# Patient Record
Sex: FEMALE | Race: BLACK OR AFRICAN AMERICAN | NOT HISPANIC OR LATINO | Employment: OTHER | URBAN - METROPOLITAN AREA
[De-identification: names, ages, dates, MRNs, and addresses within clinical notes are randomized per-mention and may not be internally consistent; named-entity substitution may affect disease eponyms.]

---

## 2019-01-18 ENCOUNTER — HOSPITAL ENCOUNTER (INPATIENT)
Facility: HOSPITAL | Age: 54
LOS: 6 days | Discharge: HOME | DRG: 543 | End: 2019-01-24
Attending: NEUROLOGICAL SURGERY | Admitting: NEUROLOGICAL SURGERY
Payer: OTHER GOVERNMENT

## 2019-01-18 DIAGNOSIS — I10 ESSENTIAL HYPERTENSION: ICD-10-CM

## 2019-01-18 DIAGNOSIS — E83.52 HYPERCALCEMIA: ICD-10-CM

## 2019-01-18 DIAGNOSIS — M25.551 HIP PAIN, BILATERAL: ICD-10-CM

## 2019-01-18 DIAGNOSIS — C79.51 SECONDARY MALIGNANT NEOPLASM OF BONE: ICD-10-CM

## 2019-01-18 DIAGNOSIS — C80.1 NECK PAIN DUE TO MALIGNANT NEOPLASTIC DISEASE: Primary | ICD-10-CM

## 2019-01-18 DIAGNOSIS — M54.2 NECK PAIN DUE TO MALIGNANT NEOPLASTIC DISEASE: Primary | ICD-10-CM

## 2019-01-18 DIAGNOSIS — E03.9 HYPOTHYROIDISM, UNSPECIFIED TYPE: ICD-10-CM

## 2019-01-18 DIAGNOSIS — E87.6 HYPOKALEMIA: ICD-10-CM

## 2019-01-18 DIAGNOSIS — C50.919 METASTATIC BREAST CANCER: ICD-10-CM

## 2019-01-18 DIAGNOSIS — F32.89 OTHER DEPRESSION: ICD-10-CM

## 2019-01-18 DIAGNOSIS — C41.2: ICD-10-CM

## 2019-01-18 DIAGNOSIS — G95.89 CERVICAL SPINAL MASS: ICD-10-CM

## 2019-01-18 DIAGNOSIS — M54.50 ACUTE BILATERAL LOW BACK PAIN WITHOUT SCIATICA: ICD-10-CM

## 2019-01-18 DIAGNOSIS — M25.552 HIP PAIN, BILATERAL: ICD-10-CM

## 2019-01-18 LAB
ABO + RH BLD: NORMAL
ANION GAP SERPL CALC-SCNC: 11 MMOL/L
APTT BLDCRRT: 25.5 SEC
BASOPHILS # BLD AUTO: 0.03 K/UL
BASOPHILS NFR BLD: 0.5 %
BLD GP AB SCN CELLS X3 SERPL QL: NORMAL
BUN SERPL-MCNC: 11 MG/DL
CALCIUM SERPL-MCNC: 10.6 MG/DL
CHLORIDE SERPL-SCNC: 105 MMOL/L
CO2 SERPL-SCNC: 25 MMOL/L
CREAT SERPL-MCNC: 0.8 MG/DL
DIFFERENTIAL METHOD: ABNORMAL
EOSINOPHIL # BLD AUTO: 0 K/UL
EOSINOPHIL NFR BLD: 0.3 %
ERYTHROCYTE [DISTWIDTH] IN BLOOD BY AUTOMATED COUNT: 15.7 %
EST. GFR  (AFRICAN AMERICAN): >60 ML/MIN/1.73 M^2
EST. GFR  (NON AFRICAN AMERICAN): >60 ML/MIN/1.73 M^2
GLUCOSE SERPL-MCNC: 73 MG/DL
HCT VFR BLD AUTO: 28.9 %
HGB BLD-MCNC: 9.3 G/DL
IMM GRANULOCYTES # BLD AUTO: 0.04 K/UL
IMM GRANULOCYTES NFR BLD AUTO: 0.6 %
INR PPP: 1.1
LYMPHOCYTES # BLD AUTO: 0.9 K/UL
LYMPHOCYTES NFR BLD: 13.9 %
MCH RBC QN AUTO: 27.7 PG
MCHC RBC AUTO-ENTMCNC: 32.2 G/DL
MCV RBC AUTO: 86 FL
MONOCYTES # BLD AUTO: 0.4 K/UL
MONOCYTES NFR BLD: 6.3 %
NEUTROPHILS # BLD AUTO: 5.1 K/UL
NEUTROPHILS NFR BLD: 78.4 %
NRBC BLD-RTO: 0 /100 WBC
PLATELET # BLD AUTO: 314 K/UL
PMV BLD AUTO: 11.3 FL
POTASSIUM SERPL-SCNC: 2.6 MMOL/L
PROTHROMBIN TIME: 11.3 SEC
RBC # BLD AUTO: 3.36 M/UL
SODIUM SERPL-SCNC: 141 MMOL/L
WBC # BLD AUTO: 6.54 K/UL

## 2019-01-18 PROCEDURE — 25000003 PHARM REV CODE 250: Performed by: PHYSICIAN ASSISTANT

## 2019-01-18 PROCEDURE — 80048 BASIC METABOLIC PNL TOTAL CA: CPT

## 2019-01-18 PROCEDURE — 93010 EKG 12-LEAD: ICD-10-PCS | Mod: ,,, | Performed by: INTERNAL MEDICINE

## 2019-01-18 PROCEDURE — 93005 ELECTROCARDIOGRAM TRACING: CPT

## 2019-01-18 PROCEDURE — 86850 RBC ANTIBODY SCREEN: CPT

## 2019-01-18 PROCEDURE — 85610 PROTHROMBIN TIME: CPT

## 2019-01-18 PROCEDURE — 99223 1ST HOSP IP/OBS HIGH 75: CPT | Mod: ,,, | Performed by: PHYSICIAN ASSISTANT

## 2019-01-18 PROCEDURE — 93010 ELECTROCARDIOGRAM REPORT: CPT | Mod: ,,, | Performed by: INTERNAL MEDICINE

## 2019-01-18 PROCEDURE — 85730 THROMBOPLASTIN TIME PARTIAL: CPT

## 2019-01-18 PROCEDURE — 99223 PR INITIAL HOSPITAL CARE,LEVL III: ICD-10-PCS | Mod: ,,, | Performed by: PHYSICIAN ASSISTANT

## 2019-01-18 PROCEDURE — 63600175 PHARM REV CODE 636 W HCPCS: Performed by: NEUROLOGICAL SURGERY

## 2019-01-18 PROCEDURE — 20600001 HC STEP DOWN PRIVATE ROOM

## 2019-01-18 PROCEDURE — 85025 COMPLETE CBC W/AUTO DIFF WBC: CPT

## 2019-01-18 RX ORDER — LEVOTHYROXINE SODIUM 200 UG/1
200 TABLET ORAL DAILY
COMMUNITY

## 2019-01-18 RX ORDER — POTASSIUM CHLORIDE 20 MEQ/1
20 TABLET, EXTENDED RELEASE ORAL 2 TIMES DAILY
Status: DISCONTINUED | OUTPATIENT
Start: 2019-01-18 | End: 2019-01-19

## 2019-01-18 RX ORDER — NIFEDIPINE 90 MG/1
90 TABLET, FILM COATED, EXTENDED RELEASE ORAL DAILY
COMMUNITY

## 2019-01-18 RX ORDER — POTASSIUM CHLORIDE 7.45 MG/ML
10 INJECTION INTRAVENOUS
Status: DISPENSED | OUTPATIENT
Start: 2019-01-18 | End: 2019-01-18

## 2019-01-18 RX ORDER — HEPARIN SODIUM 5000 [USP'U]/ML
5000 INJECTION, SOLUTION INTRAVENOUS; SUBCUTANEOUS EVERY 8 HOURS
Status: DISCONTINUED | OUTPATIENT
Start: 2019-01-19 | End: 2019-01-24 | Stop reason: HOSPADM

## 2019-01-18 RX ORDER — ERGOCALCIFEROL 1.25 MG/1
50000 CAPSULE ORAL
COMMUNITY
End: 2019-07-23 | Stop reason: ALTCHOICE

## 2019-01-18 RX ORDER — LEVOTHYROXINE SODIUM 50 UG/1
50 TABLET ORAL DAILY
Status: DISCONTINUED | OUTPATIENT
Start: 2019-01-18 | End: 2019-01-18

## 2019-01-18 RX ORDER — VENLAFAXINE HYDROCHLORIDE 75 MG/1
75 CAPSULE, EXTENDED RELEASE ORAL DAILY
Status: DISCONTINUED | OUTPATIENT
Start: 2019-01-18 | End: 2019-01-24 | Stop reason: HOSPADM

## 2019-01-18 RX ORDER — LEVOTHYROXINE SODIUM 125 UG/1
250 TABLET ORAL
Status: DISCONTINUED | OUTPATIENT
Start: 2019-01-19 | End: 2019-01-24 | Stop reason: HOSPADM

## 2019-01-18 RX ORDER — CHOLECALCIFEROL (VITAMIN D3) 25 MCG
2000 TABLET ORAL DAILY
Status: DISCONTINUED | OUTPATIENT
Start: 2019-01-18 | End: 2019-01-24 | Stop reason: HOSPADM

## 2019-01-18 RX ORDER — OXYCODONE AND ACETAMINOPHEN 5; 325 MG/1; MG/1
1 TABLET ORAL EVERY 4 HOURS PRN
Status: ON HOLD | COMMUNITY
End: 2019-01-24 | Stop reason: HOSPADM

## 2019-01-18 RX ORDER — VENLAFAXINE HYDROCHLORIDE 150 MG/1
75 CAPSULE, EXTENDED RELEASE ORAL DAILY
COMMUNITY

## 2019-01-18 RX ORDER — CETIRIZINE HYDROCHLORIDE 10 MG/1
10 TABLET ORAL DAILY
COMMUNITY

## 2019-01-18 RX ORDER — OXYCODONE AND ACETAMINOPHEN 5; 325 MG/1; MG/1
1 TABLET ORAL EVERY 4 HOURS PRN
Status: DISCONTINUED | OUTPATIENT
Start: 2019-01-18 | End: 2019-01-24 | Stop reason: HOSPADM

## 2019-01-18 RX ORDER — LEVOTHYROXINE SODIUM 50 UG/1
25 TABLET ORAL DAILY
COMMUNITY

## 2019-01-18 RX ORDER — DIPHENHYDRAMINE HCL 50 MG
50 CAPSULE ORAL EVERY 6 HOURS PRN
Status: DISCONTINUED | OUTPATIENT
Start: 2019-01-18 | End: 2019-01-24 | Stop reason: HOSPADM

## 2019-01-18 RX ORDER — NAPROXEN SODIUM 220 MG/1
81 TABLET, FILM COATED ORAL DAILY
COMMUNITY

## 2019-01-18 RX ORDER — SODIUM CHLORIDE 9 MG/ML
INJECTION, SOLUTION INTRAVENOUS CONTINUOUS
Status: DISCONTINUED | OUTPATIENT
Start: 2019-01-18 | End: 2019-01-18

## 2019-01-18 RX ORDER — NIFEDIPINE 30 MG/1
90 TABLET, EXTENDED RELEASE ORAL DAILY
Status: DISCONTINUED | OUTPATIENT
Start: 2019-01-18 | End: 2019-01-24 | Stop reason: HOSPADM

## 2019-01-18 RX ORDER — DOCUSATE CALCIUM 240 MG
240 CAPSULE ORAL 2 TIMES DAILY PRN
COMMUNITY

## 2019-01-18 RX ORDER — OXYCODONE HCL 10 MG/1
10 TABLET, FILM COATED, EXTENDED RELEASE ORAL EVERY 12 HOURS PRN
COMMUNITY
End: 2019-07-23

## 2019-01-18 RX ORDER — AMOXICILLIN 250 MG
1 CAPSULE ORAL 2 TIMES DAILY
Status: DISCONTINUED | OUTPATIENT
Start: 2019-01-18 | End: 2019-01-24 | Stop reason: HOSPADM

## 2019-01-18 RX ORDER — CHOLECALCIFEROL (VITAMIN D3) 25 MCG
2000 TABLET ORAL DAILY
COMMUNITY

## 2019-01-18 RX ORDER — FAMOTIDINE 20 MG/1
20 TABLET, FILM COATED ORAL 2 TIMES DAILY
Status: DISCONTINUED | OUTPATIENT
Start: 2019-01-18 | End: 2019-01-24 | Stop reason: HOSPADM

## 2019-01-18 RX ADMIN — SODIUM CHLORIDE: 9 INJECTION, SOLUTION INTRAVENOUS at 02:01

## 2019-01-18 RX ADMIN — POTASSIUM CHLORIDE 20 MEQ: 1500 TABLET, EXTENDED RELEASE ORAL at 09:01

## 2019-01-18 RX ADMIN — POTASSIUM CHLORIDE 20 MEQ: 1500 TABLET, EXTENDED RELEASE ORAL at 11:01

## 2019-01-18 RX ADMIN — SENNOSIDES AND DOCUSATE SODIUM 1 TABLET: 8.6; 5 TABLET ORAL at 09:01

## 2019-01-18 RX ADMIN — POTASSIUM CHLORIDE 10 MEQ: 7.46 INJECTION, SOLUTION INTRAVENOUS at 07:01

## 2019-01-18 RX ADMIN — OXYCODONE HYDROCHLORIDE AND ACETAMINOPHEN 1 TABLET: 5; 325 TABLET ORAL at 12:01

## 2019-01-18 RX ADMIN — POTASSIUM CHLORIDE 10 MEQ: 7.46 INJECTION, SOLUTION INTRAVENOUS at 04:01

## 2019-01-18 RX ADMIN — DIPHENHYDRAMINE HYDROCHLORIDE 50 MG: 50 CAPSULE ORAL at 09:01

## 2019-01-18 RX ADMIN — VITAMIN D, TAB 1000IU (100/BT) 2000 UNITS: 25 TAB at 12:01

## 2019-01-18 RX ADMIN — FAMOTIDINE 20 MG: 20 TABLET ORAL at 09:01

## 2019-01-18 RX ADMIN — OXYCODONE HYDROCHLORIDE AND ACETAMINOPHEN 1 TABLET: 5; 325 TABLET ORAL at 05:01

## 2019-01-18 RX ADMIN — POTASSIUM CHLORIDE 10 MEQ: 7.46 INJECTION, SOLUTION INTRAVENOUS at 05:01

## 2019-01-18 RX ADMIN — VENLAFAXINE HYDROCHLORIDE 75 MG: 75 CAPSULE, EXTENDED RELEASE ORAL at 12:01

## 2019-01-18 RX ADMIN — OXYCODONE HYDROCHLORIDE AND ACETAMINOPHEN 1 TABLET: 5; 325 TABLET ORAL at 09:01

## 2019-01-18 RX ADMIN — NIFEDIPINE 90 MG: 30 TABLET, FILM COATED, EXTENDED RELEASE ORAL at 12:01

## 2019-01-18 RX ADMIN — SODIUM CHLORIDE: 9 INJECTION, SOLUTION INTRAVENOUS at 09:01

## 2019-01-18 NOTE — H&P
[facility]  Neurosurgery  Progress Note    Subjective:     History of Present Illness: Ms. Chen Beckwith is a 53 year old female with a PMHx of recently diagnosed metastatic breast cancer in 12/2016, who [place] [facility] [place] [facility] for Neurosurgical evaluation. She originally presented to [facility] on 12/19/18 due to severe bilateral hip pain that limited her ability to walk. Imaging showed multiple mets in her hips and abdominal cavity. She presented back to [facility] on 1/15/19 to complete the scans of the brain and neck. At that time, it was discovered that her Ca levels were greatly increased so she was admitted. Once the imaging had resulted she was seen to have a clival mass and a C1 mass. She was transferred [place] [facility]. She reports constant neck, deltoid, back, hip, groin, and leg pain that worsens with movement or activity. She states that the neck pain has improved with the cervical collar. She denies any vision changes, seizure like activity, bladder/bowel incontinence, or symptoms of urinary retention. Denies any difficulty using her hands or dropping items frequently. Denies any use of anti coagulation.     Post-Op Info:  * No surgery found *           Medications:  Continuous Infusions:  Scheduled Meds:   famotidine  20 mg Oral BID    [START ON 1/19/2019] levothyroxine  250 mcg Oral Before breakfast    NIFEdipine  90 mg Oral Daily    potassium chloride  10 mEq Intravenous Q1H    potassium chloride  20 mEq Oral BID    senna-docusate 8.6-50 mg  1 tablet Oral BID    venlafaxine  75 mg Oral Daily    vitamin D  2,000 Units Oral Daily     PRN Meds:diphenhydrAMINE, oxyCODONE-acetaminophen     Review of Systems   Constitutional: no fever, chills or night sweats. No changes in weight   Eyes: no visual changes   ENT: no nasal congestion or sore throat   Respiratory: no cough or shortness of breath   Cardiovascular: no chest pain or palpitations    Gastrointestinal: no nausea or vomiting   Genitourinary: no hematuria or dysuria   Integument/Breast: no rash or pruritis   Hematologic/Lymphatic: no easy bruising or lymphadenopathy   Musculoskeletal: Positive for generalized pain throughout her entire body  Neurological: Positive for difficulty walking  Behavioral/Psych: no auditory or visual hallucinations   Endocrine: no heat or cold intolerance     Objective:     Weight: 79.9 kg (176 lb 2.4 oz)  Body mass index is 29.31 kg/m².  Vital Signs (Most Recent):  Temp: 98.5 °F (36.9 °C) (01/18/19 1230)  Pulse: 87 (01/18/19 1230)  Resp: 18 (01/18/19 1230)  BP: (!) 168/101 (01/18/19 1230)  SpO2: 96 % (01/18/19 1230) Vital Signs (24h Range):  Temp:  [97.8 °F (36.6 °C)-98.5 °F (36.9 °C)] 98.5 °F (36.9 °C)  Pulse:  [86-91] 87  Resp:  [16-20] 18  SpO2:  [95 %-97 %] 96 %  BP: (111-168)/() 168/101        Neurosurgery Physical Exam  General: well developed, well nourished, no distress.   Head: normocephalic, atraumatic  Neurologic: Alert and oriented. Thought content appropriate.  GCS: Motor: 6/Verbal: 5/Eyes: 4 GCS Total: 15  Mental Status: Awake, Alert, Oriented x 4  Language: No aphasia  Speech: No dysarthria  Cranial nerves: face symmetric, tongue midline, CN II-XII grossly intact.   Eyes: pupils equal, round, reactive to light with accomodation, EOMI.   Pulmonary: normal respirations, no signs of respiratory distress  Abdomen: soft, non-distended, not tender to palpation  Sensory: intact to light touch throughout    Motor Strength:Moves all extremities spontaneously with good tone.  No abnormal movements seen. Weakness is somewhat limited by pain.     Strength  Deltoids Triceps Biceps Wrist Extension Wrist Flexion Hand    Upper: R 4/5 4+/5 4+/5 5/5 5/5 5-/5    L 4/5 4+/5 4+/5 5/5 5/5 4+/5     Iliopsoas Quadriceps Knee  Flexion Tibialis  anterior Gastro- cnemius EHL   Lower: R 4/5 4+/5 4+/5 5/5 5/5 5/5    L 4+/5 5-/5 5-/5 5/5 5/5 5/5     Pronator Drift: no drift  noted  Finger-to-nose: Intact bilaterally  Ruvalcaba: absent  Clonus: present bilaterally  Babinski: absent  Skin: Skin is warm, dry and intact.          Significant Labs:  Recent Labs   Lab 01/18/19  0904   GLU 73      K 2.6*      CO2 25   BUN 11   CREATININE 0.8   CALCIUM 10.6*     Recent Labs   Lab 01/18/19  0904   WBC 6.54   HGB 9.3*   HCT 28.9*        Recent Labs   Lab 01/18/19  0904   INR 1.1   APTT 25.5         Significant Diagnostics:  Outside CT cervical spine and brain reviewed. Shows large lytic clival lesion and C1 lesion.     Assessment/Plan:     Cancer of spine    53 year old female with a PMHx of recently diagnosed metastatic breast cancer in 12/2016, who presents to [facility] as a transfer from [facility] with a newly diagnosed clival and cervical spine met.     -Patient neurologically stable on exam  -Will get a full metastatic workup before determining if surgical intervention is indicated  -MRI C/T/L spine, MRI pelvis, CT c/a/p ordered  -Patient reports mild itching with CT contrast. Denies SOB, throat swelling, hives, or other symptoms or anaphylaxis. Will order PRN Benadryl for CT.   -Will consult HemeOnc and RadOnc for prognosis and treatment recs once imaging completed  -Aspen collar ordered from [facility]. Collar to be worn at all times.   -Hypercalcemia: Ca 10.6. Will order an EKG and consult hospital medicine. Continue tele.   -HTN: Continue home dose of Nifedipine  -Hypothyroidism: Continue home dose of Synthroid  -Depression/Anxiety: Continue home dose of Venlafaxine  -DVT prophylaxis: KENNEY's, SCD's, and SQH  -Atelectasis prevention: IS hourly while awake  -Bowel regimen: Senna BID       Discussed with Dr. Shearer  Please call with any questions      Shweta Lea PA-C   Neurosurgery   Pager: [phone]

## 2019-01-18 NOTE — SUBJECTIVE & OBJECTIVE
Medications:  Continuous Infusions:  Scheduled Meds:   famotidine  20 mg Oral BID    [START ON 1/19/2019] levothyroxine  250 mcg Oral Before breakfast    NIFEdipine  90 mg Oral Daily    potassium chloride  10 mEq Intravenous Q1H    potassium chloride  20 mEq Oral BID    senna-docusate 8.6-50 mg  1 tablet Oral BID    venlafaxine  75 mg Oral Daily    vitamin D  2,000 Units Oral Daily     PRN Meds:diphenhydrAMINE, oxyCODONE-acetaminophen     Review of Systems   Constitutional: no fever, chills or night sweats. No changes in weight   Eyes: no visual changes   ENT: no nasal congestion or sore throat   Respiratory: no cough or shortness of breath   Cardiovascular: no chest pain or palpitations   Gastrointestinal: no nausea or vomiting   Genitourinary: no hematuria or dysuria   Integument/Breast: no rash or pruritis   Hematologic/Lymphatic: no easy bruising or lymphadenopathy   Musculoskeletal: Positive for generalized pain throughout her entire body  Neurological: Positive for difficulty walking  Behavioral/Psych: no auditory or visual hallucinations   Endocrine: no heat or cold intolerance     Objective:     Weight: 79.9 kg (176 lb 2.4 oz)  Body mass index is 29.31 kg/m².  Vital Signs (Most Recent):  Temp: 98.5 °F (36.9 °C) (01/18/19 1230)  Pulse: 87 (01/18/19 1230)  Resp: 18 (01/18/19 1230)  BP: (!) 168/101 (01/18/19 1230)  SpO2: 96 % (01/18/19 1230) Vital Signs (24h Range):  Temp:  [97.8 °F (36.6 °C)-98.5 °F (36.9 °C)] 98.5 °F (36.9 °C)  Pulse:  [86-91] 87  Resp:  [16-20] 18  SpO2:  [95 %-97 %] 96 %  BP: (111-168)/() 168/101        Neurosurgery Physical Exam  General: well developed, well nourished, no distress.   Head: normocephalic, atraumatic  Neurologic: Alert and oriented. Thought content appropriate.  GCS: Motor: 6/Verbal: 5/Eyes: 4 GCS Total: 15  Mental Status: Awake, Alert, Oriented x 4  Language: No aphasia  Speech: No dysarthria  Cranial nerves: face symmetric, tongue midline, CN II-XII  grossly intact.   Eyes: pupils equal, round, reactive to light with accomodation, EOMI.   Pulmonary: normal respirations, no signs of respiratory distress  Abdomen: soft, non-distended, not tender to palpation  Sensory: intact to light touch throughout    Motor Strength:Moves all extremities spontaneously with good tone.  No abnormal movements seen. Weakness is somewhat limited by pain.     Strength  Deltoids Triceps Biceps Wrist Extension Wrist Flexion Hand    Upper: R 4/5 4+/5 4+/5 5/5 5/5 5-/5    L 4/5 4+/5 4+/5 5/5 5/5 4+/5     Iliopsoas Quadriceps Knee  Flexion Tibialis  anterior Gastro- cnemius EHL   Lower: R 4/5 4+/5 4+/5 5/5 5/5 5/5    L 4+/5 5-/5 5-/5 5/5 5/5 5/5     Pronator Drift: no drift noted  Finger-to-nose: Intact bilaterally  Ruvalcaba: absent  Clonus: present bilaterally  Babinski: absent  Skin: Skin is warm, dry and intact.          Significant Labs:  Recent Labs   Lab 01/18/19  0904   GLU 73      K 2.6*      CO2 25   BUN 11   CREATININE 0.8   CALCIUM 10.6*     Recent Labs   Lab 01/18/19  0904   WBC 6.54   HGB 9.3*   HCT 28.9*        Recent Labs   Lab 01/18/19  0904   INR 1.1   APTT 25.5         Significant Diagnostics:  Outside CT cervical spine and brain reviewed. Shows large lytic clival lesion and C1 lesion.

## 2019-01-18 NOTE — ASSESSMENT & PLAN NOTE
53 year old female with a PMHx of recently diagnosed metastatic breast cancer in 12/2016, who presents to [facility] [place] a transfer from [facility] with a newly diagnosed clival and cervical spine met.     -Patient neurologically stable on exam  -Will get a full metastatic workup before determining if surgical intervention is indicated  -MRI C/T/L spine, MRI pelvis, CT c/a/p ordered  -Patient reports mild itching with CT contrast. Denies SOB, throat swelling, hives, or other symptoms or anaphylaxis. Will order PRN Benadryl for CT.   -Will consult HemeOnc and RadOnc for prognosis and treatment recs once imaging completed  -Aspen collar ordered from [facility]. Collar to be worn at all times.   -Hypercalcemia: Ca 10.6. Will order an EKG and consult hospital medicine. Continue tele.   -HTN: Continue home dose of Nifedipine  -Hypothyroidism: Continue home dose of Synthroid  -Depression/Anxiety: Continue home dose of Venlafaxine  -DVT prophylaxis: KENNEY's, SCD's, and SQH  -Atelectasis prevention: IS hourly while awake  -Bowel regimen: Senna BID

## 2019-01-18 NOTE — HOSPITAL COURSE
1/18-20: Admit to NSGY service for metastatic work up and surgical planning, if indicated. MRI brain/pan spine/pelvis ordered. CT chest/abd/pelvis ordered.   1/21: Hem/Onc & rad/onc consulted today, f/u recs.  Plan for likely d/c home with home oncologist f/u rafal.  1/22: Hem/Onc follow-up arranged at home, they will contact Rad/Onc in that area regarding treatment. Reports L tricep feels weaker after sleeping on it wrong during the night, otherwise exam stable. Dc tomorrow when transportation secured via ambulance back to [place]. Patient's insurance have to approve the transpo, SW will take care of this tomorrow morning. Reports compliance with c collar. Denies b/b dysfunction.   1/23: Hem/Onc & Rad/Onc follow up set up.  Spoke with Dr. Morris, patient's rad/onc.  Still awaiting transportation to be secured by insurance. No new weakness, paresthesias.  1/24: Patient wants to be transferred home by family. Requesting medication prior to transfer for 2 hour car ride home. Pain currently controlled with PO medication. No new weakness, paresthesias. Will follow-up with Dr. Shearer pending completion of radiation treatment.

## 2019-01-18 NOTE — PLAN OF CARE
CM met with patient, her niece and her sister to obtain discharge planning assessment.  Patient admitted from [facility] for cancer of spine.  Plan of care to be determined.  Planned discharge is home - Plan (A) or home with home health - Plan (B).       01/18/19 1204   Discharge Assessment   Assessment Type Discharge Planning Assessment   Confirmed/corrected address and phone number on facesheet? Yes   Assessment information obtained from? Patient   Communicated expected length of stay with patient/caregiver no   Prior to hospitilization cognitive status: Alert/Oriented   Prior to hospitalization functional status: Assistive Equipment   Current cognitive status: Alert/Oriented   Current Functional Status: Assistive Equipment   Lives With alone   Able to Return to Prior Arrangements yes   Is patient able to care for self after discharge? Yes   Who are your caregiver(s) and their phone number(s)? Eliana Hernandez niece [phone], Kev Hernandez sister [phone]   Patient's perception of discharge disposition home or selfcare   Readmission Within the Last 30 Days no previous admission in last 30 days   Patient currently being followed by outpatient case management? No   Patient currently receives any other outside agency services? No   Equipment Currently Used at Home walker, rolling;shower chair;bedside commode   Do you have any problems affording any of your prescribed medications? No   Is the patient taking medications as prescribed? yes   Does the patient have transportation home? Yes   Transportation Anticipated (patient may need an ambulance)   Does the patient receive services at the [facility]? No   Discharge Plan A Home   Discharge Plan B Home Health   DME Needed Upon Discharge  none   Patient/Family in Agreement with Plan yes

## 2019-01-18 NOTE — HPI
Ms. Chen Beckwith is a 53 year old female with a PMHx of recently diagnosed metastatic breast cancer in 12/2016, who presents to [facility] as a transfer from [facility] for Neurosurgical evaluation. She originally presented to [facility] on 12/19/18 due to severe bilateral hip pain that limited her ability to walk. Imaging showed multiple mets in her hips and abdominal cavity. She presented back to [facility] on 1/15/19 to complete the scans of the brain and neck. At that time, it was discovered that her Ca levels were greatly increased so she was admitted. Once the imaging had resulted she was seen to have a clival mass and a C1 mass. She was transferred to [facility]. She reports constant neck, deltoid, back, hip, groin, and leg pain that worsens with movement or activity. She states that the neck pain has improved with the cervical collar. She denies any vision changes, seizure like activity, bladder/bowel incontinence, or symptoms of urinary retention. Denies any difficulty using her hands or dropping items frequently. Denies any use of anti coagulation.

## 2019-01-19 PROBLEM — E87.6 HYPOKALEMIA: Status: ACTIVE | Noted: 2019-01-19

## 2019-01-19 LAB
25(OH)D3+25(OH)D2 SERPL-MCNC: 29 NG/ML
ALBUMIN SERPL BCP-MCNC: 2.7 G/DL
ALBUMIN SERPL BCP-MCNC: 3 G/DL
ALP SERPL-CCNC: 113 U/L
ALP SERPL-CCNC: 119 U/L
ALT SERPL W/O P-5'-P-CCNC: 22 U/L
ALT SERPL W/O P-5'-P-CCNC: 22 U/L
ANION GAP SERPL CALC-SCNC: 10 MMOL/L
ANION GAP SERPL CALC-SCNC: 14 MMOL/L
AST SERPL-CCNC: 65 U/L
AST SERPL-CCNC: 68 U/L
BASOPHILS # BLD AUTO: 0.02 K/UL
BASOPHILS NFR BLD: 0.3 %
BILIRUB DIRECT SERPL-MCNC: 0.1 MG/DL
BILIRUB SERPL-MCNC: 0.2 MG/DL
BILIRUB SERPL-MCNC: 0.2 MG/DL
BUN SERPL-MCNC: 10 MG/DL
BUN SERPL-MCNC: 9 MG/DL
CALCIUM SERPL-MCNC: 9.5 MG/DL
CALCIUM SERPL-MCNC: 9.7 MG/DL
CHLORIDE SERPL-SCNC: 104 MMOL/L
CHLORIDE SERPL-SCNC: 106 MMOL/L
CO2 SERPL-SCNC: 19 MMOL/L
CO2 SERPL-SCNC: 24 MMOL/L
CREAT SERPL-MCNC: 0.8 MG/DL
CREAT SERPL-MCNC: 0.8 MG/DL
DIFFERENTIAL METHOD: ABNORMAL
EOSINOPHIL # BLD AUTO: 0 K/UL
EOSINOPHIL NFR BLD: 0.5 %
ERYTHROCYTE [DISTWIDTH] IN BLOOD BY AUTOMATED COUNT: 15.6 %
EST. GFR  (AFRICAN AMERICAN): >60 ML/MIN/1.73 M^2
EST. GFR  (AFRICAN AMERICAN): >60 ML/MIN/1.73 M^2
EST. GFR  (NON AFRICAN AMERICAN): >60 ML/MIN/1.73 M^2
EST. GFR  (NON AFRICAN AMERICAN): >60 ML/MIN/1.73 M^2
GLUCOSE SERPL-MCNC: 59 MG/DL
GLUCOSE SERPL-MCNC: 61 MG/DL
HCT VFR BLD AUTO: 29 %
HGB BLD-MCNC: 9.1 G/DL
IMM GRANULOCYTES # BLD AUTO: 0.03 K/UL
IMM GRANULOCYTES NFR BLD AUTO: 0.5 %
LYMPHOCYTES # BLD AUTO: 1.2 K/UL
LYMPHOCYTES NFR BLD: 17.5 %
MAGNESIUM SERPL-MCNC: 1.1 MG/DL
MCH RBC QN AUTO: 26.2 PG
MCHC RBC AUTO-ENTMCNC: 31.4 G/DL
MCV RBC AUTO: 84 FL
MONOCYTES # BLD AUTO: 0.5 K/UL
MONOCYTES NFR BLD: 7.4 %
NEUTROPHILS # BLD AUTO: 4.9 K/UL
NEUTROPHILS NFR BLD: 73.8 %
NRBC BLD-RTO: 0 /100 WBC
PHOSPHATE SERPL-MCNC: 1.6 MG/DL
PLATELET # BLD AUTO: 315 K/UL
PMV BLD AUTO: 11.9 FL
POTASSIUM SERPL-SCNC: 2.8 MMOL/L
POTASSIUM SERPL-SCNC: 3.3 MMOL/L
PROT SERPL-MCNC: 6.5 G/DL
PROT SERPL-MCNC: 7.1 G/DL
PTH-INTACT SERPL-MCNC: 36 PG/ML
RBC # BLD AUTO: 3.47 M/UL
SODIUM SERPL-SCNC: 137 MMOL/L
SODIUM SERPL-SCNC: 140 MMOL/L
T4 FREE SERPL-MCNC: 1.14 NG/DL
TSH SERPL DL<=0.005 MIU/L-ACNC: 12.51 UIU/ML
WBC # BLD AUTO: 6.63 K/UL

## 2019-01-19 PROCEDURE — 36415 COLL VENOUS BLD VENIPUNCTURE: CPT

## 2019-01-19 PROCEDURE — 84100 ASSAY OF PHOSPHORUS: CPT

## 2019-01-19 PROCEDURE — 80076 HEPATIC FUNCTION PANEL: CPT

## 2019-01-19 PROCEDURE — 25000003 PHARM REV CODE 250: Performed by: PHYSICIAN ASSISTANT

## 2019-01-19 PROCEDURE — 25500020 PHARM REV CODE 255: Performed by: NEUROLOGICAL SURGERY

## 2019-01-19 PROCEDURE — 82652 VIT D 1 25-DIHYDROXY: CPT

## 2019-01-19 PROCEDURE — 99222 1ST HOSP IP/OBS MODERATE 55: CPT | Mod: ,,, | Performed by: HOSPITALIST

## 2019-01-19 PROCEDURE — 84443 ASSAY THYROID STIM HORMONE: CPT

## 2019-01-19 PROCEDURE — 83970 ASSAY OF PARATHORMONE: CPT

## 2019-01-19 PROCEDURE — 83735 ASSAY OF MAGNESIUM: CPT

## 2019-01-19 PROCEDURE — 99222 PR INITIAL HOSPITAL CARE,LEVL II: ICD-10-PCS | Mod: ,,, | Performed by: HOSPITALIST

## 2019-01-19 PROCEDURE — 25000003 PHARM REV CODE 250: Performed by: STUDENT IN AN ORGANIZED HEALTH CARE EDUCATION/TRAINING PROGRAM

## 2019-01-19 PROCEDURE — 84439 ASSAY OF FREE THYROXINE: CPT

## 2019-01-19 PROCEDURE — 63600175 PHARM REV CODE 636 W HCPCS: Performed by: STUDENT IN AN ORGANIZED HEALTH CARE EDUCATION/TRAINING PROGRAM

## 2019-01-19 PROCEDURE — 93005 ELECTROCARDIOGRAM TRACING: CPT

## 2019-01-19 PROCEDURE — 63600175 PHARM REV CODE 636 W HCPCS: Performed by: PHYSICIAN ASSISTANT

## 2019-01-19 PROCEDURE — 80048 BASIC METABOLIC PNL TOTAL CA: CPT

## 2019-01-19 PROCEDURE — 20600001 HC STEP DOWN PRIVATE ROOM

## 2019-01-19 PROCEDURE — 93010 EKG 12-LEAD: ICD-10-PCS | Mod: ,,, | Performed by: INTERNAL MEDICINE

## 2019-01-19 PROCEDURE — 82306 VITAMIN D 25 HYDROXY: CPT

## 2019-01-19 PROCEDURE — 80053 COMPREHEN METABOLIC PANEL: CPT

## 2019-01-19 PROCEDURE — 93010 ELECTROCARDIOGRAM REPORT: CPT | Mod: ,,, | Performed by: INTERNAL MEDICINE

## 2019-01-19 PROCEDURE — A9585 GADOBUTROL INJECTION: HCPCS | Performed by: NEUROLOGICAL SURGERY

## 2019-01-19 PROCEDURE — 85025 COMPLETE CBC W/AUTO DIFF WBC: CPT

## 2019-01-19 RX ORDER — POTASSIUM CHLORIDE 7.45 MG/ML
10 INJECTION INTRAVENOUS ONCE
Status: DISCONTINUED | OUTPATIENT
Start: 2019-01-19 | End: 2019-01-19

## 2019-01-19 RX ORDER — POTASSIUM CHLORIDE 7.45 MG/ML
10 INJECTION INTRAVENOUS ONCE
Status: COMPLETED | OUTPATIENT
Start: 2019-01-19 | End: 2019-01-19

## 2019-01-19 RX ORDER — MAGNESIUM SULFATE HEPTAHYDRATE 40 MG/ML
2 INJECTION, SOLUTION INTRAVENOUS ONCE
Status: DISCONTINUED | OUTPATIENT
Start: 2019-01-19 | End: 2019-01-19

## 2019-01-19 RX ORDER — DIPHENHYDRAMINE HYDROCHLORIDE 50 MG/ML
50 INJECTION INTRAMUSCULAR; INTRAVENOUS ONCE
Status: COMPLETED | OUTPATIENT
Start: 2019-01-19 | End: 2019-01-20

## 2019-01-19 RX ORDER — POTASSIUM CHLORIDE 20 MEQ/1
40 TABLET, EXTENDED RELEASE ORAL 2 TIMES DAILY
Status: COMPLETED | OUTPATIENT
Start: 2019-01-19 | End: 2019-01-19

## 2019-01-19 RX ORDER — MAGNESIUM SULFATE HEPTAHYDRATE 40 MG/ML
2 INJECTION, SOLUTION INTRAVENOUS ONCE
Status: COMPLETED | OUTPATIENT
Start: 2019-01-19 | End: 2019-01-19

## 2019-01-19 RX ORDER — GADOBUTROL 604.72 MG/ML
8 INJECTION INTRAVENOUS
Status: COMPLETED | OUTPATIENT
Start: 2019-01-19 | End: 2019-01-19

## 2019-01-19 RX ADMIN — DIBASIC SODIUM PHOSPHATE, MONOBASIC POTASSIUM PHOSPHATE AND MONOBASIC SODIUM PHOSPHATE 1 TABLET: 852; 155; 130 TABLET ORAL at 10:01

## 2019-01-19 RX ADMIN — POTASSIUM CHLORIDE 10 MEQ: 7.46 INJECTION, SOLUTION INTRAVENOUS at 12:01

## 2019-01-19 RX ADMIN — HYDROCORTISONE SODIUM SUCCINATE 200 MG: 100 INJECTION, POWDER, FOR SOLUTION INTRAMUSCULAR; INTRAVENOUS at 11:01

## 2019-01-19 RX ADMIN — OXYCODONE HYDROCHLORIDE AND ACETAMINOPHEN 1 TABLET: 5; 325 TABLET ORAL at 08:01

## 2019-01-19 RX ADMIN — FAMOTIDINE 20 MG: 20 TABLET ORAL at 10:01

## 2019-01-19 RX ADMIN — VENLAFAXINE HYDROCHLORIDE 75 MG: 75 CAPSULE, EXTENDED RELEASE ORAL at 08:01

## 2019-01-19 RX ADMIN — DIPHENHYDRAMINE HYDROCHLORIDE 50 MG: 50 CAPSULE ORAL at 03:01

## 2019-01-19 RX ADMIN — MAGNESIUM SULFATE IN WATER 2 G: 40 INJECTION, SOLUTION INTRAVENOUS at 10:01

## 2019-01-19 RX ADMIN — GADOBUTROL 8 ML: 604.72 INJECTION INTRAVENOUS at 05:01

## 2019-01-19 RX ADMIN — POTASSIUM CHLORIDE 40 MEQ: 1500 TABLET, EXTENDED RELEASE ORAL at 10:01

## 2019-01-19 RX ADMIN — SENNOSIDES AND DOCUSATE SODIUM 1 TABLET: 8.6; 5 TABLET ORAL at 08:01

## 2019-01-19 RX ADMIN — DIBASIC SODIUM PHOSPHATE, MONOBASIC POTASSIUM PHOSPHATE AND MONOBASIC SODIUM PHOSPHATE 1 TABLET: 852; 155; 130 TABLET ORAL at 12:01

## 2019-01-19 RX ADMIN — HEPARIN SODIUM 5000 UNITS: 5000 INJECTION, SOLUTION INTRAVENOUS; SUBCUTANEOUS at 06:01

## 2019-01-19 RX ADMIN — HYDROCORTISONE SODIUM SUCCINATE 200 MG: 100 INJECTION, POWDER, FOR SOLUTION INTRAMUSCULAR; INTRAVENOUS at 12:01

## 2019-01-19 RX ADMIN — POTASSIUM CHLORIDE 20 MEQ: 1500 TABLET, EXTENDED RELEASE ORAL at 10:01

## 2019-01-19 RX ADMIN — OXYCODONE HYDROCHLORIDE AND ACETAMINOPHEN 1 TABLET: 5; 325 TABLET ORAL at 10:01

## 2019-01-19 RX ADMIN — HEPARIN SODIUM 5000 UNITS: 5000 INJECTION, SOLUTION INTRAVENOUS; SUBCUTANEOUS at 10:01

## 2019-01-19 RX ADMIN — LEVOTHYROXINE SODIUM 250 MCG: 125 TABLET ORAL at 06:01

## 2019-01-19 RX ADMIN — OXYCODONE HYDROCHLORIDE AND ACETAMINOPHEN 1 TABLET: 5; 325 TABLET ORAL at 02:01

## 2019-01-19 RX ADMIN — HEPARIN SODIUM 5000 UNITS: 5000 INJECTION, SOLUTION INTRAVENOUS; SUBCUTANEOUS at 02:01

## 2019-01-19 RX ADMIN — NIFEDIPINE 90 MG: 30 TABLET, FILM COATED, EXTENDED RELEASE ORAL at 08:01

## 2019-01-19 RX ADMIN — OXYCODONE HYDROCHLORIDE AND ACETAMINOPHEN 1 TABLET: 5; 325 TABLET ORAL at 03:01

## 2019-01-19 RX ADMIN — SENNOSIDES AND DOCUSATE SODIUM 1 TABLET: 8.6; 5 TABLET ORAL at 10:01

## 2019-01-19 RX ADMIN — POTASSIUM CHLORIDE 20 MEQ: 1500 TABLET, EXTENDED RELEASE ORAL at 08:01

## 2019-01-19 RX ADMIN — FAMOTIDINE 20 MG: 20 TABLET ORAL at 08:01

## 2019-01-19 RX ADMIN — VITAMIN D, TAB 1000IU (100/BT) 2000 UNITS: 25 TAB at 08:01

## 2019-01-19 RX ADMIN — POTASSIUM PHOSPHATE, MONOBASIC AND POTASSIUM PHOSPHATE, DIBASIC 15 MMOL: 224; 236 INJECTION, SOLUTION, CONCENTRATE INTRAVENOUS at 02:01

## 2019-01-19 NOTE — ASSESSMENT & PLAN NOTE
K 2.6 on admission 1/18/19 with EKG showing U waves in v4-v5 with early T wave depression  - KCl repletion with 30mEq IVPB and 20mEq PO given per primary team (1/18)  - Repeat K this morning 2.8, repeat EKG shows resolution of hypokalemic changes  - Mg level came back 1.1, 2g IV Mg given  - Additional KCl / Kphos replacement given IV and PO  - Repeat CMP pending noon today, will adjust K replacement regimen PRN

## 2019-01-19 NOTE — NURSING
PCT observed family member turning off patient's bed alarm. Educated family to keep bed alarm on for patient safety and to call staff for assistance. Family continues to turn bed alarm off to get patient up to commode without assistance. Patient stated she did not want the bed alarm on.

## 2019-01-19 NOTE — SUBJECTIVE & OBJECTIVE
History reviewed. No pertinent past medical history.    History reviewed. No pertinent surgical history.    Review of patient's allergies indicates:   Allergen Reactions    Ace inhibitors Swelling     Tongue swells     Contrast media Rash       No current facility-administered medications on file prior to encounter.      Current Outpatient Medications on File Prior to Encounter   Medication Sig    aspirin 81 MG Chew Take 81 mg by mouth once daily.    cetirizine (ZYRTEC) 10 MG tablet Take 10 mg by mouth once daily.    docusate calcium (SURFAK) 240 mg capsule Take 240 mg by mouth 2 (two) times daily as needed for Constipation (once to twice daily as needed for constipation).    ergocalciferol (ERGOCALCIFEROL) 50,000 unit Cap Take 50,000 Units by mouth every 7 days.    levothyroxine (SYNTHROID) 200 MCG tablet Take 200 mcg by mouth once daily.    levothyroxine (SYNTHROID) 50 MCG tablet Take 50 mcg by mouth once daily.    NIFEdipine (ADALAT CC) 90 MG TbSR Take 90 mg by mouth once daily.    oxyCODONE (OXYCONTIN) 10 mg 12 hr tablet Take 10 mg by mouth every 12 (twelve) hours as needed for Pain.    oxyCODONE-acetaminophen (PERCOCET) 5-325 mg per tablet Take 1 tablet by mouth every 4 (four) hours as needed for Pain.    ranitidine (ZANTAC) 150 MG tablet Take 150 mg by mouth 2 (two) times daily.    venlafaxine (EFFEXOR-XR) 150 MG Cp24 Take 75 mg by mouth once daily.    vitamin D (VITAMIN D3) 1000 units Tab Take 2,000 Units by mouth once daily.     Family History     None        Tobacco Use    Smoking status: Not on file   Substance and Sexual Activity    Alcohol use: Not on file    Drug use: Not on file    Sexual activity: Not on file     Review of Systems   Constitutional: Positive for appetite change, fatigue and unexpected weight change (30lbs wt loss in 6 wks). Negative for chills and fever.        Lethargic appearance, but in no acute distress lying in bed with C-collar in situ.    HENT: Negative for  postnasal drip, rhinorrhea, sinus pressure and sinus pain.    Eyes: Negative for photophobia and visual disturbance.   Respiratory: Negative for cough, shortness of breath and wheezing.    Cardiovascular: Negative for chest pain, palpitations and leg swelling.   Gastrointestinal: Positive for nausea. Negative for diarrhea and vomiting.   Genitourinary: Negative for decreased urine volume, dysuria, flank pain, hematuria and urgency.   Musculoskeletal: Positive for arthralgias, back pain and neck pain. Negative for neck stiffness.        Generalized pain of large joints worse on articulation in the setting of diffuse metastatic bone disease   Neurological: Positive for weakness. Negative for dizziness, light-headedness and headaches.   Psychiatric/Behavioral: Negative for confusion and decreased concentration.     Objective:     Vital Signs (Most Recent):  Temp: 98.6 °F (37 °C) (01/19/19 0741)  Pulse: 92 (01/19/19 0741)  Resp: 16 (01/19/19 0741)  BP: 118/81 (01/19/19 0741)  SpO2: 95 % (01/19/19 0741) Vital Signs (24h Range):  Temp:  [98.1 °F (36.7 °C)-99.7 °F (37.6 °C)] 98.6 °F (37 °C)  Pulse:  [83-97] 92  Resp:  [16-18] 16  SpO2:  [92 %-96 %] 95 %  BP: (118-168)/() 118/81     Weight: 79.9 kg (176 lb 2.4 oz)  Body mass index is 29.31 kg/m².    Physical Exam   Constitutional: She is oriented to person, place, and time. She appears well-developed and well-nourished. No distress.   HENT:   Head: Normocephalic and atraumatic.   Eyes: Conjunctivae are normal. No scleral icterus.   Neck: Neck supple. No JVD present. No tracheal deviation present.   Cardiovascular: Normal rate, regular rhythm, normal heart sounds and intact distal pulses. Exam reveals no gallop and no friction rub.   No murmur heard.  Pulmonary/Chest: Effort normal and breath sounds normal. No stridor. No respiratory distress. She has no wheezes. She has no rales.   Abdominal: Soft. Bowel sounds are normal. She exhibits no mass. There is no tenderness.  There is no rebound and no guarding.   Musculoskeletal: She exhibits tenderness (Generalized large joint tenderness/arthralgias worse on articulation). She exhibits no edema.   Neurological: She is alert and oriented to person, place, and time.   Skin: Skin is warm and dry. She is not diaphoretic.   Psychiatric: She has a normal mood and affect. Her behavior is normal.   Vitals reviewed.      Significant Labs:   Recent Lab Results       01/19/19  0441        Immature Granulocytes 0.5     Immature Grans (Abs) 0.03  Comment:  Mild elevation in immature granulocytes is non specific and   can be seen in a variety of conditions including stress response,   acute inflammation, trauma and pregnancy. Correlation with other   laboratory and clinical findings is essential.       Albumin 2.7     Alkaline Phosphatase 113     ALT 22     Anion Gap 10     AST 65     Baso # 0.02     Basophil% 0.3     Bilirubin, Direct 0.1     Total Bilirubin 0.2  Comment:  For infants and newborns, interpretation of results should be based  on gestational age, weight and in agreement with clinical  observations.  Premature Infant recommended reference ranges:  Up to 24 hours.............<8.0 mg/dL  Up to 48 hours............<12.0 mg/dL  3-5 days..................<15.0 mg/dL  6-29 days.................<15.0 mg/dL       BUN, Bld 9     Calcium 9.5     Chloride 106     CO2 24     Creatinine 0.8     Differential Method Automated     eGFR if African American >60.0     eGFR if non  >60.0  Comment:  Calculation used to obtain the estimated glomerular filtration  rate (eGFR) is the CKD-EPI equation.        Eos # 0.0     Eosinophil% 0.5     Glucose 59     Gran # (ANC) 4.9     Gran% 73.8     Hematocrit 29.0     Hemoglobin 9.1     Lymph # 1.2     Lymph% 17.5     Magnesium 1.1     MCH 26.2     MCHC 31.4     MCV 84     Mono # 0.5     Mono% 7.4     MPV 11.9     nRBC 0     Phosphorus 1.6     Platelets 315     Potassium 2.8     Total Protein 6.5      RBC 3.47     RDW 15.6     Sodium 140     WBC 6.63         All pertinent labs within the past 24 hours have been reviewed.    Significant Imaging: I have reviewed all pertinent imaging results/findings within the past 24 hours.

## 2019-01-19 NOTE — PLAN OF CARE
Problem: Adult Inpatient Plan of Care  Goal: Plan of Care Review  Outcome: Ongoing (interventions implemented as appropriate)  POC reviewed with patient and family. Verbalized understanding. No acute events over night. VSS. Fall and emergency precautions in place. Will continue to monitor. Pt continues to refuse bed alarm. Will continue to monitor.    Patient currently off floor obtaining MRI.

## 2019-01-19 NOTE — HPI
Reason for consult: Hypercalcemia    HPI: 53F PMHx Metastatic breast cancer (dx 2016), thyroid cancer (dx 1994 s/p thyroidectomy on levothyroxine), HTN. Presents to [facility] 12/19/18 c/o severe b/l hip pain limiting ability to ambulate. Imaging revealed mets to hips and abdominal cavity. On presentation for f/u imaging 1/15/19 was found to have significant hypercalcemia. Clival and C1 masses were identified on cranial imaging and pt was xfer to [facility] for NSGY evaluation.    On ROS pt has generalized back/neck/hip/shoulder/leg pain related to systemic mets. Denies urinary or bowel symptoms, no visual changes, no palpitations, cough, SoB, no f/c or night sweats. 30lbs wt loss in 6 weeks.    Vitals WNL, K of 2.6 on admission 1/18/19, given 30mEq IVPB and 20mEq PO Kcl, now K 2.8 this morning. Pt denies new muscle cramping, palpitations or other hypokalemic symptomatology. EKG on admission shows early signs of T wave flattening with U waves in V4-V5, now resolved on repeat EKG.     Calcium of 10.6 on admit now 9.5 with albumin 2.7.

## 2019-01-19 NOTE — ASSESSMENT & PLAN NOTE
Corrected calcium 11.6 on admit, but 10.5 corrected on repeat 1/19  - Albumin 2.7, Phos 1.6  - Likely hypercalcemia of malignancy in the setting of bone mets  - Will check PTH, vitamin D levels  - Pt currently taking long term vitamin D supplement 2000 U daily, will trend Calcium and d/c if continues to be hypercalcemic. Though VitD helpful for concomitant hypophosphatemia.   - Phos replacement PRN

## 2019-01-19 NOTE — PROGRESS NOTES
Pt has multiple MRIs ordered. Due to length of study it was split between two days. Brain, c-spine and T-spine done today. L-spine and pelvis imaging to be done 24hrs post contrast on 1/20 @5am. Pt verbalized understanding.

## 2019-01-19 NOTE — CONSULTS
[facility] Hospital Medicine  Consult Note    Patient Name: Chen Beckwith  MRN: [id number]  Admission Date: 1/18/2019  Hospital Length of Stay: 1 days  Attending Physician: Ajith Shearer MD   Primary Care Provider: No primary care provider on file.     [facility] Medicine Team: Networked reference to record PCT  Scott Logan MD      Patient information was obtained from patient and past medical records.     Inpatient consult to [facility] Medicine-General  Consult performed by: Scott Logan MD  Consult ordered by: BALJINDER Holcomb        Subjective:     Principal Problem: <principal problem not specified>    Chief Complaint: Hip pain secondary to metastatic breast cancer       HPI: Reason for consult: Hypercalcemia    HPI: 53F PMHx Metastatic breast cancer (dx 2016), thyroid cancer (dx 1994 s/p thyroidectomy on levothyroxine), HTN. Presents to [facility] 12/19/18 c/o severe b/l hip pain limiting ability to ambulate. Imaging revealed mets to hips and abdominal cavity. On presentation for f/u imaging 1/15/19 was found to have significant hypercalcemia. Clival and C1 masses were identified on cranial imaging and [place] [facility] [place]    On ROS pt has generalized back/neck/hip/shoulder/leg pain related to systemic mets. Denies urinary or bowel symptoms, no visual changes, no palpitations, cough, SoB, no f/c or night sweats. 30lbs wt loss in 6 weeks.    Vitals WNL, K of 2.6 on admission 1/18/19, given 30mEq IVPB and 20mEq PO Kcl, now K 2.8 this morning. Pt denies new muscle cramping, palpitations or other hypokalemic symptomatology. EKG on admission shows early signs of T wave flattening with U waves in V4-V5, now resolved on repeat EKG.     Calcium of 10.6 on admit now 9.5 with albumin 2.7.         Review of patient's allergies indicates:   Allergen Reactions    Ace inhibitors Swelling     Tongue swells     Contrast media Rash       No current  facility-administered medications on file prior to encounter.      Current Outpatient Medications on File Prior to Encounter   Medication Sig    aspirin 81 MG Chew Take 81 mg by mouth once daily.    cetirizine (ZYRTEC) 10 MG tablet Take 10 mg by mouth once daily.    docusate calcium (SURFAK) 240 mg capsule Take 240 mg by mouth 2 (two) times daily as needed for Constipation (once to twice daily as needed for constipation).    ergocalciferol (ERGOCALCIFEROL) 50,000 unit Cap Take 50,000 Units by mouth every 7 days.    levothyroxine (SYNTHROID) 200 MCG tablet Take 200 mcg by mouth once daily.    levothyroxine (SYNTHROID) 50 MCG tablet Take 50 mcg by mouth once daily.    NIFEdipine (ADALAT CC) 90 MG TbSR Take 90 mg by mouth once daily.    oxyCODONE (OXYCONTIN) 10 mg 12 hr tablet Take 10 mg by mouth every 12 (twelve) hours as needed for Pain.    oxyCODONE-acetaminophen (PERCOCET) 5-325 mg per tablet Take 1 tablet by mouth every 4 (four) hours as needed for Pain.    ranitidine (ZANTAC) 150 MG tablet Take 150 mg by mouth 2 (two) times daily.    venlafaxine (EFFEXOR-XR) 150 MG Cp24 Take 75 mg by mouth once daily.    vitamin D (VITAMIN D3) 1000 units Tab Take 2,000 Units by mouth once daily.     Family History     None        Tobacco Use    Smoking status: Not on file   Substance and Sexual Activity    Alcohol use: Not on file    Drug use: Not on file    Sexual activity: Not on file     Review of Systems   Constitutional: Positive for appetite change, fatigue and unexpected weight change (30lbs wt loss in 6 wks). Negative for chills and fever.        Lethargic appearance, but in no acute distress lying in bed with C-collar in situ.    HENT: Negative for postnasal drip, rhinorrhea, sinus pressure and sinus pain.    Eyes: Negative for photophobia and visual disturbance.   Respiratory: Negative for cough, shortness of breath and wheezing.    Cardiovascular: Negative for chest pain, palpitations and leg swelling.    Gastrointestinal: Positive for nausea. Negative for diarrhea and vomiting.   Genitourinary: Negative for decreased urine volume, dysuria, flank pain, hematuria and urgency.   Musculoskeletal: Positive for arthralgias, back pain and neck pain. Negative for neck stiffness.        Generalized pain of large joints worse on articulation in the setting of diffuse metastatic bone disease   Neurological: Positive for weakness. Negative for dizziness, light-headedness and headaches.   Psychiatric/Behavioral: Negative for confusion and decreased concentration.     Objective:     Vital Signs (Most Recent):  Temp: 98.6 °F (37 °C) (01/19/19 0741)  Pulse: 92 (01/19/19 0741)  Resp: 16 (01/19/19 0741)  BP: 118/81 (01/19/19 0741)  SpO2: 95 % (01/19/19 0741) Vital Signs (24h Range):  Temp:  [98.1 °F (36.7 °C)-99.7 °F (37.6 °C)] 98.6 °F (37 °C)  Pulse:  [83-97] 92  Resp:  [16-18] 16  SpO2:  [92 %-96 %] 95 %  BP: (118-168)/() 118/81     Weight: 79.9 kg (176 lb 2.4 oz)  Body mass index is 29.31 kg/m².    Physical Exam   Constitutional: She is oriented to person, place, and time. She appears well-developed and well-nourished. No distress.   HENT:   Head: Normocephalic and atraumatic.   Eyes: Conjunctivae are normal. No scleral icterus.   Neck: Neck supple. No JVD present. No tracheal deviation present.   Cardiovascular: Normal rate, regular rhythm, normal heart sounds and intact distal pulses. Exam reveals no gallop and no friction rub.   No murmur heard.  Pulmonary/Chest: Effort normal and breath sounds normal. No stridor. No respiratory distress. She has no wheezes. She has no rales.   Abdominal: Soft. Bowel sounds are normal. She exhibits no mass. There is no tenderness. There is no rebound and no guarding.   Musculoskeletal: She exhibits tenderness (Generalized large joint tenderness/arthralgias worse on articulation). She exhibits no edema.   Neurological: She is alert and oriented to person, place, and time.   Skin: Skin is  warm and dry. She is not diaphoretic.   Psychiatric: She has a normal mood and affect. Her behavior is normal.   Vitals reviewed.      Significant Labs:   Recent Lab Results       01/19/19  0441        Immature Granulocytes 0.5     Immature Grans (Abs) 0.03  Comment:  Mild elevation in immature granulocytes is non specific and   can be seen in a variety of conditions including stress response,   acute inflammation, trauma and pregnancy. Correlation with other   laboratory and clinical findings is essential.       Albumin 2.7     Alkaline Phosphatase 113     ALT 22     Anion Gap 10     AST 65     Baso # 0.02     Basophil% 0.3     Bilirubin, Direct 0.1     Total Bilirubin 0.2  Comment:  For infants and newborns, interpretation of results should be based  on gestational age, weight and in agreement with clinical  observations.  Premature Infant recommended reference ranges:  Up to 24 hours.............<8.0 mg/dL  Up to 48 hours............<12.0 mg/dL  3-5 days..................<15.0 mg/dL  6-29 days.................<15.0 mg/dL       BUN, Bld 9     Calcium 9.5     Chloride 106     CO2 24     Creatinine 0.8     Differential Method Automated     eGFR if African American >60.0     eGFR if non  >60.0  Comment:  Calculation used to obtain the estimated glomerular filtration  rate (eGFR) is the CKD-EPI equation.        Eos # 0.0     Eosinophil% 0.5     Glucose 59     Gran # (ANC) 4.9     Gran% 73.8     Hematocrit 29.0     Hemoglobin 9.1     Lymph # 1.2     Lymph% 17.5     Magnesium 1.1     MCH 26.2     MCHC 31.4     MCV 84     Mono # 0.5     Mono% 7.4     MPV 11.9     nRBC 0     Phosphorus 1.6     Platelets 315     Potassium 2.8     Total Protein 6.5     RBC 3.47     RDW 15.6     Sodium 140     WBC 6.63         All pertinent labs within the past 24 hours have been reviewed.    Significant Imaging: I have reviewed all pertinent imaging results/findings within the past 24 hours.    Assessment/Plan:      Hypokalemia    K 2.6 on admission 1/18/19 with EKG showing U waves in v4-v5 with early T wave depression  - KCl repletion with 30mEq IVPB and 20mEq PO given per primary team (1/18)  - Repeat K this morning 2.8, repeat EKG shows resolution of hypokalemic changes  - Mg level came back 1.1, 2g IV Mg given  - Additional KCl / Kphos replacement given IV and PO  - Repeat CMP pending noon today, will adjust K replacement regimen PRN     Hypothyroidism    Hx thyroid cancer s/p thyroidectomy (1994) now on long term levothyroxine  - TSH pending  - Continuing home levothyroxine 250mcg daily pending thyroid levels       Depression    Continue home venlafaxine 75mg daily       Hypercalcemia    Corrected calcium 11.6 on admit, but 10.5 corrected on repeat 1/19  - Albumin 2.7, Phos 1.6  - Likely hypercalcemia of malignancy in the setting of bone mets  - Will check PTH, vitamin D levels  - Pt currently taking long term vitamin D supplement 2000 U daily, will trend Calcium and d/c if continues to be hypercalcemic. Though VitD helpful for concomitant hypophosphatemia.   - Phos replacement PRN       Cancer of spine    Per NSGY         VTE Risk Mitigation (From admission, onward)        Ordered     heparin (porcine) injection 5,000 Units  Every 8 hours      01/18/19 1749     Place KENNEY hose  Until discontinued      01/18/19 0815     Place sequential compression device  Until discontinued      01/18/19 0815     IP VTE HIGH RISK PATIENT  Once      01/18/19 0815              Thank you for your consult. I will follow-up with patient. Please contact us if you have any additional questions.    Scott Logan MD  Department of Hospital Medicine   [facility]

## 2019-01-19 NOTE — ASSESSMENT & PLAN NOTE
Hx thyroid cancer s/p thyroidectomy (1994) now on long term levothyroxine  - TSH pending  - Continuing home levothyroxine 250mcg daily pending thyroid levels

## 2019-01-20 LAB
ALBUMIN SERPL BCP-MCNC: 3.2 G/DL
ALP SERPL-CCNC: 124 U/L
ALT SERPL W/O P-5'-P-CCNC: 24 U/L
ANION GAP SERPL CALC-SCNC: 11 MMOL/L
AST SERPL-CCNC: 63 U/L
BASOPHILS # BLD AUTO: 0.01 K/UL
BASOPHILS NFR BLD: 0.2 %
BILIRUB SERPL-MCNC: 0.3 MG/DL
BUN SERPL-MCNC: 12 MG/DL
CALCIUM SERPL-MCNC: 9.4 MG/DL
CHLORIDE SERPL-SCNC: 100 MMOL/L
CO2 SERPL-SCNC: 24 MMOL/L
CREAT SERPL-MCNC: 0.8 MG/DL
DIFFERENTIAL METHOD: ABNORMAL
EOSINOPHIL # BLD AUTO: 0 K/UL
EOSINOPHIL NFR BLD: 0 %
ERYTHROCYTE [DISTWIDTH] IN BLOOD BY AUTOMATED COUNT: 15.9 %
EST. GFR  (AFRICAN AMERICAN): >60 ML/MIN/1.73 M^2
EST. GFR  (NON AFRICAN AMERICAN): >60 ML/MIN/1.73 M^2
GLUCOSE SERPL-MCNC: 85 MG/DL
HCT VFR BLD AUTO: 31.1 %
HGB BLD-MCNC: 10.1 G/DL
IMM GRANULOCYTES # BLD AUTO: 0.02 K/UL
IMM GRANULOCYTES NFR BLD AUTO: 0.3 %
LYMPHOCYTES # BLD AUTO: 0.7 K/UL
LYMPHOCYTES NFR BLD: 10.4 %
MAGNESIUM SERPL-MCNC: 1.7 MG/DL
MCH RBC QN AUTO: 27.5 PG
MCHC RBC AUTO-ENTMCNC: 32.5 G/DL
MCV RBC AUTO: 85 FL
MONOCYTES # BLD AUTO: 0.2 K/UL
MONOCYTES NFR BLD: 3 %
NEUTROPHILS # BLD AUTO: 5.7 K/UL
NEUTROPHILS NFR BLD: 86.1 %
NRBC BLD-RTO: 0 /100 WBC
PHOSPHATE SERPL-MCNC: 2.1 MG/DL
PLATELET # BLD AUTO: 379 K/UL
PMV BLD AUTO: 11.5 FL
POTASSIUM SERPL-SCNC: 3.5 MMOL/L
PROT SERPL-MCNC: 7.5 G/DL
RBC # BLD AUTO: 3.67 M/UL
SODIUM SERPL-SCNC: 135 MMOL/L
WBC # BLD AUTO: 6.56 K/UL

## 2019-01-20 PROCEDURE — 99232 PR SUBSEQUENT HOSPITAL CARE,LEVL II: ICD-10-PCS | Mod: ,,, | Performed by: NEUROLOGICAL SURGERY

## 2019-01-20 PROCEDURE — 25000003 PHARM REV CODE 250: Performed by: STUDENT IN AN ORGANIZED HEALTH CARE EDUCATION/TRAINING PROGRAM

## 2019-01-20 PROCEDURE — 25000003 PHARM REV CODE 250: Performed by: PHYSICIAN ASSISTANT

## 2019-01-20 PROCEDURE — 25500020 PHARM REV CODE 255: Performed by: NEUROLOGICAL SURGERY

## 2019-01-20 PROCEDURE — 25500020 PHARM REV CODE 255: Performed by: STUDENT IN AN ORGANIZED HEALTH CARE EDUCATION/TRAINING PROGRAM

## 2019-01-20 PROCEDURE — 99232 SBSQ HOSP IP/OBS MODERATE 35: CPT | Mod: ,,, | Performed by: NEUROLOGICAL SURGERY

## 2019-01-20 PROCEDURE — 63600175 PHARM REV CODE 636 W HCPCS: Performed by: STUDENT IN AN ORGANIZED HEALTH CARE EDUCATION/TRAINING PROGRAM

## 2019-01-20 PROCEDURE — A9585 GADOBUTROL INJECTION: HCPCS | Performed by: NEUROLOGICAL SURGERY

## 2019-01-20 PROCEDURE — 36415 COLL VENOUS BLD VENIPUNCTURE: CPT

## 2019-01-20 PROCEDURE — 63600175 PHARM REV CODE 636 W HCPCS: Performed by: PHYSICIAN ASSISTANT

## 2019-01-20 PROCEDURE — 85025 COMPLETE CBC W/AUTO DIFF WBC: CPT

## 2019-01-20 PROCEDURE — 83735 ASSAY OF MAGNESIUM: CPT

## 2019-01-20 PROCEDURE — 84100 ASSAY OF PHOSPHORUS: CPT

## 2019-01-20 PROCEDURE — 20600001 HC STEP DOWN PRIVATE ROOM

## 2019-01-20 PROCEDURE — 80053 COMPREHEN METABOLIC PANEL: CPT

## 2019-01-20 RX ORDER — LANOLIN ALCOHOL/MO/W.PET/CERES
400 CREAM (GRAM) TOPICAL DAILY
Status: DISCONTINUED | OUTPATIENT
Start: 2019-01-20 | End: 2019-01-24 | Stop reason: HOSPADM

## 2019-01-20 RX ORDER — OXYCODONE HCL 10 MG/1
10 TABLET, FILM COATED, EXTENDED RELEASE ORAL EVERY 12 HOURS
Status: DISCONTINUED | OUTPATIENT
Start: 2019-01-20 | End: 2019-01-24 | Stop reason: HOSPADM

## 2019-01-20 RX ORDER — HYDROMORPHONE HYDROCHLORIDE 1 MG/ML
1 INJECTION, SOLUTION INTRAMUSCULAR; INTRAVENOUS; SUBCUTANEOUS ONCE
Status: COMPLETED | OUTPATIENT
Start: 2019-01-20 | End: 2019-01-20

## 2019-01-20 RX ORDER — POTASSIUM CHLORIDE 20 MEQ/1
40 TABLET, EXTENDED RELEASE ORAL 2 TIMES DAILY
Status: COMPLETED | OUTPATIENT
Start: 2019-01-20 | End: 2019-01-20

## 2019-01-20 RX ORDER — GADOBUTROL 604.72 MG/ML
9 INJECTION INTRAVENOUS
Status: COMPLETED | OUTPATIENT
Start: 2019-01-20 | End: 2019-01-20

## 2019-01-20 RX ADMIN — VENLAFAXINE HYDROCHLORIDE 75 MG: 75 CAPSULE, EXTENDED RELEASE ORAL at 08:01

## 2019-01-20 RX ADMIN — HYDROMORPHONE HYDROCHLORIDE 1 MG: 1 INJECTION, SOLUTION INTRAMUSCULAR; INTRAVENOUS; SUBCUTANEOUS at 11:01

## 2019-01-20 RX ADMIN — OXYCODONE HYDROCHLORIDE 10 MG: 10 TABLET, FILM COATED, EXTENDED RELEASE ORAL at 09:01

## 2019-01-20 RX ADMIN — IOHEXOL 15 ML: 350 INJECTION, SOLUTION INTRAVENOUS at 03:01

## 2019-01-20 RX ADMIN — HEPARIN SODIUM 5000 UNITS: 5000 INJECTION, SOLUTION INTRAVENOUS; SUBCUTANEOUS at 09:01

## 2019-01-20 RX ADMIN — SENNOSIDES AND DOCUSATE SODIUM 1 TABLET: 8.6; 5 TABLET ORAL at 09:01

## 2019-01-20 RX ADMIN — LEVOTHYROXINE SODIUM 250 MCG: 125 TABLET ORAL at 05:01

## 2019-01-20 RX ADMIN — FAMOTIDINE 20 MG: 20 TABLET ORAL at 09:01

## 2019-01-20 RX ADMIN — OXYCODONE HYDROCHLORIDE AND ACETAMINOPHEN 1 TABLET: 5; 325 TABLET ORAL at 10:01

## 2019-01-20 RX ADMIN — POTASSIUM CHLORIDE 40 MEQ: 1500 TABLET, EXTENDED RELEASE ORAL at 12:01

## 2019-01-20 RX ADMIN — GADOBUTROL 9 ML: 604.72 INJECTION INTRAVENOUS at 11:01

## 2019-01-20 RX ADMIN — NIFEDIPINE 90 MG: 30 TABLET, FILM COATED, EXTENDED RELEASE ORAL at 08:01

## 2019-01-20 RX ADMIN — DIPHENHYDRAMINE HYDROCHLORIDE 50 MG: 50 INJECTION INTRAMUSCULAR; INTRAVENOUS at 02:01

## 2019-01-20 RX ADMIN — IOHEXOL 100 ML: 350 INJECTION, SOLUTION INTRAVENOUS at 04:01

## 2019-01-20 RX ADMIN — OXYCODONE HYDROCHLORIDE AND ACETAMINOPHEN 1 TABLET: 5; 325 TABLET ORAL at 05:01

## 2019-01-20 RX ADMIN — SENNOSIDES AND DOCUSATE SODIUM 1 TABLET: 8.6; 5 TABLET ORAL at 08:01

## 2019-01-20 RX ADMIN — HEPARIN SODIUM 5000 UNITS: 5000 INJECTION, SOLUTION INTRAVENOUS; SUBCUTANEOUS at 02:01

## 2019-01-20 RX ADMIN — POTASSIUM CHLORIDE 40 MEQ: 1500 TABLET, EXTENDED RELEASE ORAL at 09:01

## 2019-01-20 RX ADMIN — VITAMIN D, TAB 1000IU (100/BT) 2000 UNITS: 25 TAB at 08:01

## 2019-01-20 RX ADMIN — HEPARIN SODIUM 5000 UNITS: 5000 INJECTION, SOLUTION INTRAVENOUS; SUBCUTANEOUS at 05:01

## 2019-01-20 RX ADMIN — DIBASIC SODIUM PHOSPHATE, MONOBASIC POTASSIUM PHOSPHATE AND MONOBASIC SODIUM PHOSPHATE 1 TABLET: 852; 155; 130 TABLET ORAL at 08:01

## 2019-01-20 RX ADMIN — FAMOTIDINE 20 MG: 20 TABLET ORAL at 08:01

## 2019-01-20 RX ADMIN — MAGNESIUM OXIDE TAB 400 MG (241.3 MG ELEMENTAL MG) 400 MG: 400 (241.3 MG) TAB at 12:01

## 2019-01-20 RX ADMIN — DIBASIC SODIUM PHOSPHATE, MONOBASIC POTASSIUM PHOSPHATE AND MONOBASIC SODIUM PHOSPHATE 1 TABLET: 852; 155; 130 TABLET ORAL at 09:01

## 2019-01-20 RX ADMIN — OXYCODONE HYDROCHLORIDE 10 MG: 10 TABLET, FILM COATED, EXTENDED RELEASE ORAL at 12:01

## 2019-01-20 NOTE — ASSESSMENT & PLAN NOTE
53 year old female with a PMHx of recently diagnosed metastatic breast cancer in 12/2016, who presents to [facility] [place] as a transfer from [facility] with a newly diagnosed clival and cervical spine met.     -Patient neurologically stable on exam  -Will get a full metastatic workup before determining if surgical intervention is indicated  -MRI C/T/L spine, MRI pelvis, CT c/a/p ordered  -Patient reports mild itching with CT contrast. Denies SOB, throat swelling, hives, or other symptoms or anaphylaxis. Will order PRN Benadryl for CT.   -Will consult HemeOnc and RadOnc for prognosis and treatment recs once imaging completed  -Aspen collar ordered from [facility]. Collar to be worn at all times. .   -HTN: Continue home dose of Nifedipine  -Hypothyroidism: Continue home dose of Synthroid  -Depression/Anxiety: Continue home dose of Venlafaxine  -DVT prophylaxis: KENNEY's, SCD's, and SQH  -Atelectasis prevention: IS hourly while awake  -Bowel regimen: Senna BID

## 2019-01-20 NOTE — PROGRESS NOTES
[facility]  Neurosurgery  Progress Note    Subjective:     History of Present Illness: Ms. Chen Beckwith is a 53 year old female with a PMHx of recently diagnosed metastatic breast cancer in 12/2016, who [place] [facility] [place] from [facility] for Neurosurgical evaluation. She originally presented to [facility] on 12/19/18 due to severe bilateral hip pain that limited her ability to walk. Imaging showed multiple mets in her hips and abdominal cavity. She presented back to [facility] on 1/15/19 to complete the scans of the brain and neck. At that time, it was discovered that her Ca levels were greatly increased so she was admitted. Once the imaging had resulted she was seen to have a clival mass and a C1 mass. She was transferred to [facility]. She reports constant neck, deltoid, back, hip, groin, and leg pain that worsens with movement or activity. She states that the neck pain has improved with the cervical collar. She denies any vision changes, seizure like activity, bladder/bowel incontinence, or symptoms of urinary retention. Denies any difficulty using her hands or dropping items frequently. Denies any use of anti coagulation.     Post-Op Info:  * No surgery found *         Interval History: 1/18-9: Admit to NSGY service for metastatic work up and surgical planning, if indicated. MRI brain/pan spine/pelvis ordered. CT chest/abd/pelvis ordered.     Medications:  Continuous Infusions:  Scheduled Meds:   diphenhydrAMINE  50 mg Intravenous Once    famotidine  20 mg Oral BID    heparin (porcine)  5,000 Units Subcutaneous Q8H    k phos di & mono-sod phos mono  1 tablet Oral BID    levothyroxine  250 mcg Oral Before breakfast    NIFEdipine  90 mg Oral Daily    potassium chloride  40 mEq Oral BID    senna-docusate 8.6-50 mg  1 tablet Oral BID    venlafaxine  75 mg Oral Daily    vitamin D  2,000 Units Oral Daily     PRN Meds:diphenhydrAMINE, omnipaque, oxyCODONE-acetaminophen        Objective:     Weight: 79.9 kg (176 lb 2.4 oz)  Body mass index is 29.31 kg/m².  Vital Signs (Most Recent):  Temp: 99.1 °F (37.3 °C) (01/19/19 2016)  Pulse: 96 (01/19/19 2016)  Resp: 17 (01/19/19 2016)  BP: 122/77 (01/19/19 2016)  SpO2: 99 % (01/19/19 2016) Vital Signs (24h Range):  Temp:  [97.3 °F (36.3 °C)-99.7 °F (37.6 °C)] 99.1 °F (37.3 °C)  Pulse:  [83-96] 96  Resp:  [16-18] 17  SpO2:  [92 %-99 %] 99 %  BP: (118-132)/(74-88) 122/77                           Neurosurgery Physical Exam     Neurosurgery Physical Exam  General: well developed, well nourished, no distress.   Head: normocephalic, atraumatic  Neurologic: Alert and oriented. Thought content appropriate.  GCS: Motor: 6/Verbal: 5/Eyes: 4 GCS Total: 15  Mental Status: Awake, Alert, Oriented x 4  Language: No aphasia  Speech: No dysarthria  Cranial nerves: face symmetric, tongue midline, CN II-XII grossly intact.   Eyes: pupils equal, round, reactive to light with accomodation, EOMI.   Pulmonary: normal respirations, no signs of respiratory distress  Abdomen: soft, non-distended, not tender to palpation  Sensory: intact to light touch throughout     Motor Strength:Moves all extremities spontaneously with good tone.  No abnormal movements seen. Weakness is somewhat limited by pain.      Strength   Deltoids Triceps Biceps Wrist Extension Wrist Flexion Hand    Upper: R 4/5 4+/5 4+/5 5/5 5/5 5-/5     L 4/5 4+/5 4+/5 5/5 5/5 4+/5       Iliopsoas Quadriceps Knee  Flexion Tibialis  anterior Gastro- cnemius EHL   Lower: R 4/5 4+/5 4+/5 5/5 5/5 5/5     L 4+/5 5-/5 5-/5 5/5 5/5 5/5      Pronator Drift: no drift noted  Finger-to-nose: Intact bilaterally  Ruvalcaba: absent  Clonus: present bilaterally  Babinski: absent  Skin: Skin is warm, dry and intact.          Significant Labs:  Recent Labs   Lab 01/18/19  0904 01/19/19  0441 01/19/19  1253   GLU 73 59* 61*    140 137   K 2.6* 2.8* 3.3*    106 104   CO2 25 24 19*   BUN 11 9 10   CREATININE 0.8  0.8 0.8   CALCIUM 10.6* 9.5 9.7   MG  --  1.1*  --      Recent Labs   Lab 01/18/19  0904 01/19/19  0441   WBC 6.54 6.63   HGB 9.3* 9.1*   HCT 28.9* 29.0*    315     Recent Labs   Lab 01/18/19  0904   INR 1.1   APTT 25.5     Microbiology Results (last 7 days)     ** No results found for the last 168 hours. **        Assessment/Plan:     Cancer of spine    53 year old female with a PMHx of recently diagnosed metastatic breast cancer in 12/2016, who presents to [facility] [place] a transfer from [facility] with a newly diagnosed clival and cervical spine met.     -Patient neurologically stable on exam  -Will get a full metastatic workup before determining if surgical intervention is indicated  -MRI C/T/L spine, MRI pelvis, CT c/a/p ordered  -Patient reports mild itching with CT contrast. Denies SOB, throat swelling, hives, or other symptoms or anaphylaxis. Will order PRN Benadryl for CT.   -Will consult HemeOnc and RadOnc for prognosis and treatment recs once imaging completed  -Aspen collar ordered from [facility]. Collar to be worn at all times.   -Hypercalcemia: Ca 10.6. Will order an EKG and consult hospital medicine. Continue tele.   -HTN: Continue home dose of Nifedipine  -Hypothyroidism: Continue home dose of Synthroid  -Depression/Anxiety: Continue home dose of Venlafaxine  -DVT prophylaxis: KENNEY's, SCD's, and SQH  -Atelectasis prevention: IS hourly while awake  -Bowel regimen: Senna BID         Jez Gallardo MD  Neurosurgery  [facility]

## 2019-01-20 NOTE — PROGRESS NOTES
[facility]  Neurosurgery  Progress Note    Subjective:     History of Present Illness: Ms. Chen Beckwith is a 53 year old female with a PMHx of recently diagnosed metastatic breast cancer in 12/2016, who [place] to [facility] [place] from [facility] for Neurosurgical evaluation. She originally presented to [facility] on 12/19/18 due to severe bilateral hip pain that limited her ability to walk. Imaging showed multiple mets in her hips and abdominal cavity. She presented back to [facility] on 1/15/19 to complete the scans of the brain and neck. At that time, it was discovered that her Ca levels were greatly increased so she was admitted. Once the imaging had resulted she was seen to have a clival mass and a C1 mass. She was transferred to [facility]. She reports constant neck, deltoid, back, hip, groin, and leg pain that worsens with movement or activity. She states that the neck pain has improved with the cervical collar. She denies any vision changes, seizure like activity, bladder/bowel incontinence, or symptoms of urinary retention. Denies any difficulty using her hands or dropping items frequently. Denies any use of anti coagulation.     Post-Op Info:  * No surgery found *         Interval History: 1/18-20: Admit to NSGY service for metastatic work up and surgical planning, if indicated. MRI brain/pan spine/pelvis ordered. CT chest/abd/pelvis ordered.     Medications:  Continuous Infusions:  Scheduled Meds:   diphenhydrAMINE  50 mg Intravenous Once    famotidine  20 mg Oral BID    heparin (porcine)  5,000 Units Subcutaneous Q8H    k phos di & mono-sod phos mono  1 tablet Oral BID    levothyroxine  250 mcg Oral Before breakfast    NIFEdipine  90 mg Oral Daily    potassium chloride  40 mEq Oral BID    senna-docusate 8.6-50 mg  1 tablet Oral BID    venlafaxine  75 mg Oral Daily    vitamin D  2,000 Units Oral Daily     PRN Meds:diphenhydrAMINE, omnipaque, oxyCODONE-acetaminophen        Objective:     Weight: 79.9 kg (176 lb 2.4 oz)  Body mass index is 29.31 kg/m².  Vital Signs (Most Recent):  Temp: 99.1 °F (37.3 °C) (01/19/19 2016)  Pulse: 96 (01/19/19 2016)  Resp: 17 (01/19/19 2016)  BP: 122/77 (01/19/19 2016)  SpO2: 99 % (01/19/19 2016) Vital Signs (24h Range):  Temp:  [97.3 °F (36.3 °C)-99.7 °F (37.6 °C)] 99.1 °F (37.3 °C)  Pulse:  [83-96] 96  Resp:  [16-18] 17  SpO2:  [92 %-99 %] 99 %  BP: (118-132)/(74-88) 122/77                           Neurosurgery Physical Exam     Neurosurgery Physical Exam  General: well developed, well nourished, no distress.   Head: normocephalic, atraumatic  Neurologic: Alert and oriented. Thought content appropriate.  GCS: Motor: 6/Verbal: 5/Eyes: 4 GCS Total: 15  Mental Status: Awake, Alert, Oriented x 4  Language: No aphasia  Speech: No dysarthria  Cranial nerves: face symmetric, tongue midline, CN II-XII grossly intact.   Eyes: pupils equal, round, reactive to light with accomodation, EOMI.   Pulmonary: normal respirations, no signs of respiratory distress  Abdomen: soft, non-distended, not tender to palpation  Sensory: intact to light touch throughout     Motor Strength:Moves all extremities spontaneously with good tone.  No abnormal movements seen. Weakness is somewhat limited by pain.      Strength   Deltoids Triceps Biceps Wrist Extension Wrist Flexion Hand    Upper: R 4/5 4+/5 4+/5 5/5 5/5 5-/5     L 4/5 4+/5 4+/5 5/5 5/5 4+/5       Iliopsoas Quadriceps Knee  Flexion Tibialis  anterior Gastro- cnemius EHL   Lower: R 4/5 4+/5 4+/5 5/5 5/5 5/5     L 4+/5 5-/5 5-/5 5/5 5/5 5/5      Pronator Drift: no drift noted  Finger-to-nose: Intact bilaterally  Ruvalcaba: absent  Clonus: present bilaterally  Babinski: absent  Skin: Skin is warm, dry and intact.             Significant Labs:  Recent Labs   Lab 01/18/19  0904 01/19/19  0441 01/19/19  1253   GLU 73 59* 61*    140 137   K 2.6* 2.8* 3.3*    106 104   CO2 25 24 19*   BUN 11 9 10   CREATININE  0.8 0.8 0.8   CALCIUM 10.6* 9.5 9.7   MG  --  1.1*  --      Recent Labs   Lab 01/18/19  0904 01/19/19  0441   WBC 6.54 6.63   HGB 9.3* 9.1*   HCT 28.9* 29.0*    315     Recent Labs   Lab 01/18/19  0904   INR 1.1   APTT 25.5     Microbiology Results (last 7 days)     ** No results found for the last 168 hours. **        Assessment/Plan:     Cancer of spine    53 year old female with a PMHx of recently diagnosed metastatic breast cancer in 12/2016, who [place] [facility] [place] [facility] with a newly diagnosed clival and cervical spine met.     -Patient neurologically stable on exam  -MRI C/T/L spine, MRI pelvis, CT c/a/p completed, showing diffuse bony metastases  -Patient reports mild itching with CT contrast. Denies SOB, throat swelling, hives, or other symptoms or anaphylaxis. Will order PRN Benadryl for CT.   -Will consult HemeOnc and RadOnc for prognosis and treatment recs  -Aspen collar to be worn at all times. .   -HTN: Continue home dose of Nifedipine  -Hypothyroidism: Continue home dose of Synthroid  -Depression/Anxiety: Continue home dose of Venlafaxine  -DVT prophylaxis: KENNEY's, SCD's, and SQH  -Atelectasis prevention: IS hourly while awake  -Bowel regimen: Senna BID         Jez Gallardo MD  Neurosurgery  [facility]

## 2019-01-20 NOTE — ASSESSMENT & PLAN NOTE
53 year old female with a PMHx of recently diagnosed metastatic breast cancer in 12/2016, who presents to [facility] as a transfer from [facility] with a newly diagnosed clival and cervical spine met.     -Patient neurologically stable on exam  -Will get a full metastatic workup before determining if surgical intervention is indicated  -MRI C/T/L spine, MRI pelvis, CT c/a/p ordered  -Patient reports mild itching with CT contrast. Denies SOB, throat swelling, hives, or other symptoms or anaphylaxis. Will order PRN Benadryl for CT.   -Will consult HemeOnc and RadOnc for prognosis and treatment recs once imaging completed  -Aspen collar ordered from [facility]. Collar to be worn at all times.   -Hypercalcemia: Ca 10.6. Will order an EKG and consult hospital medicine. Continue tele.   -HTN: Continue home dose of Nifedipine  -Hypothyroidism: Continue home dose of Synthroid  -Depression/Anxiety: Continue home dose of Venlafaxine  -DVT prophylaxis: KENNEY's, SCD's, and SQH  -Atelectasis prevention: IS hourly while awake  -Bowel regimen: Senna BID

## 2019-01-20 NOTE — SUBJECTIVE & OBJECTIVE
Interval History: 1/18-9: Admit to NSGY service for metastatic work up and surgical planning, if indicated. MRI brain/pan spine/pelvis ordered. CT chest/abd/pelvis ordered.     Medications:  Continuous Infusions:  Scheduled Meds:   diphenhydrAMINE  50 mg Intravenous Once    famotidine  20 mg Oral BID    heparin (porcine)  5,000 Units Subcutaneous Q8H    k phos di & mono-sod phos mono  1 tablet Oral BID    levothyroxine  250 mcg Oral Before breakfast    NIFEdipine  90 mg Oral Daily    potassium chloride  40 mEq Oral BID    senna-docusate 8.6-50 mg  1 tablet Oral BID    venlafaxine  75 mg Oral Daily    vitamin D  2,000 Units Oral Daily     PRN Meds:diphenhydrAMINE, omnipaque, oxyCODONE-acetaminophen       Objective:     Weight: 79.9 kg (176 lb 2.4 oz)  Body mass index is 29.31 kg/m².  Vital Signs (Most Recent):  Temp: 99.1 °F (37.3 °C) (01/19/19 2016)  Pulse: 96 (01/19/19 2016)  Resp: 17 (01/19/19 2016)  BP: 122/77 (01/19/19 2016)  SpO2: 99 % (01/19/19 2016) Vital Signs (24h Range):  Temp:  [97.3 °F (36.3 °C)-99.7 °F (37.6 °C)] 99.1 °F (37.3 °C)  Pulse:  [83-96] 96  Resp:  [16-18] 17  SpO2:  [92 %-99 %] 99 %  BP: (118-132)/(74-88) 122/77                           Neurosurgery Physical Exam     Neurosurgery Physical Exam  General: well developed, well nourished, no distress.   Head: normocephalic, atraumatic  Neurologic: Alert and oriented. Thought content appropriate.  GCS: Motor: 6/Verbal: 5/Eyes: 4 GCS Total: 15  Mental Status: Awake, Alert, Oriented x 4  Language: No aphasia  Speech: No dysarthria  Cranial nerves: face symmetric, tongue midline, CN II-XII grossly intact.   Eyes: pupils equal, round, reactive to light with accomodation, EOMI.   Pulmonary: normal respirations, no signs of respiratory distress  Abdomen: soft, non-distended, not tender to palpation  Sensory: intact to light touch throughout     Motor Strength:Moves all extremities spontaneously with good tone.  No abnormal movements seen.  Weakness is somewhat limited by pain.      Strength   Deltoids Triceps Biceps Wrist Extension Wrist Flexion Hand    Upper: R 4/5 4+/5 4+/5 5/5 5/5 5-/5     L 4/5 4+/5 4+/5 5/5 5/5 4+/5       Iliopsoas Quadriceps Knee  Flexion Tibialis  anterior Gastro- cnemius EHL   Lower: R 4/5 4+/5 4+/5 5/5 5/5 5/5     L 4+/5 5-/5 5-/5 5/5 5/5 5/5      Pronator Drift: no drift noted  Finger-to-nose: Intact bilaterally  Ruvalcaba: absent  Clonus: present bilaterally  Babinski: absent  Skin: Skin is warm, dry and intact.          Significant Labs:  Recent Labs   Lab 01/18/19  0904 01/19/19  0441 01/19/19  1253   GLU 73 59* 61*    140 137   K 2.6* 2.8* 3.3*    106 104   CO2 25 24 19*   BUN 11 9 10   CREATININE 0.8 0.8 0.8   CALCIUM 10.6* 9.5 9.7   MG  --  1.1*  --      Recent Labs   Lab 01/18/19  0904 01/19/19  0441   WBC 6.54 6.63   HGB 9.3* 9.1*   HCT 28.9* 29.0*    315     Recent Labs   Lab 01/18/19  0904   INR 1.1   APTT 25.5     Microbiology Results (last 7 days)     ** No results found for the last 168 hours. **

## 2019-01-20 NOTE — SUBJECTIVE & OBJECTIVE
Interval History: 1/18-20: Admit to NSGY service for metastatic work up and surgical planning, if indicated. MRI brain/pan spine/pelvis ordered. CT chest/abd/pelvis ordered.     Medications:  Continuous Infusions:  Scheduled Meds:   diphenhydrAMINE  50 mg Intravenous Once    famotidine  20 mg Oral BID    heparin (porcine)  5,000 Units Subcutaneous Q8H    k phos di & mono-sod phos mono  1 tablet Oral BID    levothyroxine  250 mcg Oral Before breakfast    NIFEdipine  90 mg Oral Daily    potassium chloride  40 mEq Oral BID    senna-docusate 8.6-50 mg  1 tablet Oral BID    venlafaxine  75 mg Oral Daily    vitamin D  2,000 Units Oral Daily     PRN Meds:diphenhydrAMINE, omnipaque, oxyCODONE-acetaminophen       Objective:     Weight: 79.9 kg (176 lb 2.4 oz)  Body mass index is 29.31 kg/m².  Vital Signs (Most Recent):  Temp: 99.1 °F (37.3 °C) (01/19/19 2016)  Pulse: 96 (01/19/19 2016)  Resp: 17 (01/19/19 2016)  BP: 122/77 (01/19/19 2016)  SpO2: 99 % (01/19/19 2016) Vital Signs (24h Range):  Temp:  [97.3 °F (36.3 °C)-99.7 °F (37.6 °C)] 99.1 °F (37.3 °C)  Pulse:  [83-96] 96  Resp:  [16-18] 17  SpO2:  [92 %-99 %] 99 %  BP: (118-132)/(74-88) 122/77                           Neurosurgery Physical Exam     Neurosurgery Physical Exam  General: well developed, well nourished, no distress.   Head: normocephalic, atraumatic  Neurologic: Alert and oriented. Thought content appropriate.  GCS: Motor: 6/Verbal: 5/Eyes: 4 GCS Total: 15  Mental Status: Awake, Alert, Oriented x 4  Language: No aphasia  Speech: No dysarthria  Cranial nerves: face symmetric, tongue midline, CN II-XII grossly intact.   Eyes: pupils equal, round, reactive to light with accomodation, EOMI.   Pulmonary: normal respirations, no signs of respiratory distress  Abdomen: soft, non-distended, not tender to palpation  Sensory: intact to light touch throughout     Motor Strength:Moves all extremities spontaneously with good tone.  No abnormal movements seen.  Weakness is somewhat limited by pain.      Strength   Deltoids Triceps Biceps Wrist Extension Wrist Flexion Hand    Upper: R 4/5 4+/5 4+/5 5/5 5/5 5-/5     L 4/5 4+/5 4+/5 5/5 5/5 4+/5       Iliopsoas Quadriceps Knee  Flexion Tibialis  anterior Gastro- cnemius EHL   Lower: R 4/5 4+/5 4+/5 5/5 5/5 5/5     L 4+/5 5-/5 5-/5 5/5 5/5 5/5      Pronator Drift: no drift noted  Finger-to-nose: Intact bilaterally  Ruvalcaba: absent  Clonus: present bilaterally  Babinski: absent  Skin: Skin is warm, dry and intact.             Significant Labs:  Recent Labs   Lab 01/18/19  0904 01/19/19  0441 01/19/19  1253   GLU 73 59* 61*    140 137   K 2.6* 2.8* 3.3*    106 104   CO2 25 24 19*   BUN 11 9 10   CREATININE 0.8 0.8 0.8   CALCIUM 10.6* 9.5 9.7   MG  --  1.1*  --      Recent Labs   Lab 01/18/19  0904 01/19/19  0441   WBC 6.54 6.63   HGB 9.3* 9.1*   HCT 28.9* 29.0*    315     Recent Labs   Lab 01/18/19  0904   INR 1.1   APTT 25.5     Microbiology Results (last 7 days)     ** No results found for the last 168 hours. **

## 2019-01-20 NOTE — ASSESSMENT & PLAN NOTE
K 2.6 on admission 1/18/19 with EKG showing U waves in v4-v5 with early T wave depression  - KCl repletion with 30mEq IVPB and 20mEq PO given per primary team (1/18)  - Repeat K this morning 2.8, repeat EKG shows resolution of hypokalemic changes  - Mg level came back 1.1, 2g IV Mg given  - Additional KCl / Kphos replacement given IV and PO  - Repeat CMP pending noon today, will adjust K replacement regimen PRN    1/20 -- K now 3.5, continue PRN PO replacement

## 2019-01-20 NOTE — PROGRESS NOTES
[facility] Hospital Medicine  Progress Note    Patient Name: Chen Beckwith  MRN: [id number]  Patient Class: IP- Inpatient   Admission Date: 1/18/2019  Length of Stay: 2 days  Attending Physician: Ajith Shearer MD  Primary Care Provider: No primary care provider on file.    Hospital Medicine Team: Networked reference to record PCT  Scott Logan MD    Subjective:     Principal Problem:<principal problem not specified>    HPI:  Reason for consult: Hypercalcemia    HPI: 53F PMHx Metastatic breast cancer (dx 2016), thyroid cancer (dx 1994 s/p thyroidectomy on levothyroxine), HTN. Presents to [facility] 12/19/18 c/o severe b/l hip pain limiting ability to ambulate. Imaging revealed mets to hips and abdominal cavity. On presentation for f/u imaging 1/15/19 was found to have significant hypercalcemia. Clival and C1 masses were identified on cranial imaging and pt was xfer to [facility] for NSGY evaluation.    On ROS pt has generalized back/neck/hip/shoulder/leg pain related to systemic mets. Denies urinary or bowel symptoms, no visual changes, no palpitations, cough, SoB, no f/c or night sweats. 30lbs wt loss in 6 weeks.    Vitals WNL, K of 2.6 on admission 1/18/19, given 30mEq IVPB and 20mEq PO Kcl, now K 2.8 this morning. Pt denies new muscle cramping, palpitations or other hypokalemic symptomatology. EKG on admission shows early signs of T wave flattening with U waves in V4-V5, now resolved on repeat EKG.     Calcium of 10.6 on admit now 9.5 with albumin 2.7.         Hospital Course:  No notes on file    Interval history: NAEO, no new medical concerns or complaints at this time.     Review of patient's allergies indicates:   Allergen Reactions    Ace inhibitors Swelling     Tongue swells     Contrast media Rash       No current facility-administered medications on file prior to encounter.      Current Outpatient Medications on File Prior to Encounter   Medication Sig    aspirin 81 MG  Chew Take 81 mg by mouth once daily.    cetirizine (ZYRTEC) 10 MG tablet Take 10 mg by mouth once daily.    docusate calcium (SURFAK) 240 mg capsule Take 240 mg by mouth 2 (two) times daily as needed for Constipation (once to twice daily as needed for constipation).    ergocalciferol (ERGOCALCIFEROL) 50,000 unit Cap Take 50,000 Units by mouth every 7 days.    levothyroxine (SYNTHROID) 200 MCG tablet Take 200 mcg by mouth once daily.    levothyroxine (SYNTHROID) 50 MCG tablet Take 50 mcg by mouth once daily.    NIFEdipine (ADALAT CC) 90 MG TbSR Take 90 mg by mouth once daily.    oxyCODONE (OXYCONTIN) 10 mg 12 hr tablet Take 10 mg by mouth every 12 (twelve) hours as needed for Pain.    oxyCODONE-acetaminophen (PERCOCET) 5-325 mg per tablet Take 1 tablet by mouth every 4 (four) hours as needed for Pain.    ranitidine (ZANTAC) 150 MG tablet Take 150 mg by mouth 2 (two) times daily.    venlafaxine (EFFEXOR-XR) 150 MG Cp24 Take 75 mg by mouth once daily.    vitamin D (VITAMIN D3) 1000 units Tab Take 2,000 Units by mouth once daily.     Family History     None        Tobacco Use    Smoking status: Not on file   Substance and Sexual Activity    Alcohol use: Not on file    Drug use: Not on file    Sexual activity: Not on file     Review of Systems   Constitutional: Positive for appetite change, fatigue and unexpected weight change (30lbs wt loss in 6 wks). Negative for chills and fever.        Lethargic appearance, but in no acute distress lying in bed with C-collar in situ.    HENT: Negative for postnasal drip, rhinorrhea, sinus pressure and sinus pain.    Eyes: Negative for photophobia and visual disturbance.   Respiratory: Negative for cough, shortness of breath and wheezing.    Cardiovascular: Negative for chest pain, palpitations and leg swelling.   Gastrointestinal: Positive for nausea. Negative for diarrhea and vomiting.   Genitourinary: Negative for decreased urine volume, dysuria, flank pain,  hematuria and urgency.   Musculoskeletal: Positive for arthralgias, back pain and neck pain. Negative for neck stiffness.        Generalized pain of large joints worse on articulation in the setting of diffuse metastatic bone disease   Neurological: Positive for weakness. Negative for dizziness, light-headedness and headaches.   Psychiatric/Behavioral: Negative for confusion and decreased concentration.     Objective:     Vital Signs (Most Recent):  Temp: 97.7 °F (36.5 °C) (01/20/19 1205)  Pulse: 100 (01/20/19 1205)  Resp: 18 (01/20/19 1205)  BP: 118/82 (01/20/19 1205)  SpO2: (!) 94 % (01/20/19 1205) Vital Signs (24h Range):  Temp:  [97.3 °F (36.3 °C)-99.1 °F (37.3 °C)] 97.7 °F (36.5 °C)  Pulse:  [] 100  Resp:  [17-18] 18  SpO2:  [93 %-99 %] 94 %  BP: (113-132)/(77-88) 118/82     Weight: 79.9 kg (176 lb 2.4 oz)  Body mass index is 29.31 kg/m².    Physical Exam   Constitutional: She is oriented to person, place, and time. She appears well-developed and well-nourished. No distress.   HENT:   Head: Normocephalic and atraumatic.   Eyes: Conjunctivae are normal. No scleral icterus.   Neck: Neck supple. No JVD present. No tracheal deviation present.   Cardiovascular: Normal rate, regular rhythm, normal heart sounds and intact distal pulses. Exam reveals no gallop and no friction rub.   No murmur heard.  Pulmonary/Chest: Effort normal and breath sounds normal. No stridor. No respiratory distress. She has no wheezes. She has no rales.   Abdominal: Soft. Bowel sounds are normal. She exhibits no mass. There is no tenderness. There is no rebound and no guarding.   Musculoskeletal: She exhibits tenderness (Generalized large joint tenderness/arthralgias worse on articulation). She exhibits no edema.   Neurological: She is alert and oriented to person, place, and time.   Skin: Skin is warm and dry. She is not diaphoretic.   Psychiatric: She has a normal mood and affect. Her behavior is normal.   Vitals  reviewed.      Significant Labs:   Recent Lab Results       01/20/19  0534        Immature Granulocytes 0.3     Immature Grans (Abs) 0.02  Comment:  Mild elevation in immature granulocytes is non specific and   can be seen in a variety of conditions including stress response,   acute inflammation, trauma and pregnancy. Correlation with other   laboratory and clinical findings is essential.       Albumin 3.2     Alkaline Phosphatase 124     ALT 24     Anion Gap 11     AST 63     Baso # 0.01     Basophil% 0.2     Total Bilirubin 0.3  Comment:  For infants and newborns, interpretation of results should be based  on gestational age, weight and in agreement with clinical  observations.  Premature Infant recommended reference ranges:  Up to 24 hours.............<8.0 mg/dL  Up to 48 hours............<12.0 mg/dL  3-5 days..................<15.0 mg/dL  6-29 days.................<15.0 mg/dL       BUN, Bld 12     Calcium 9.4     Chloride 100     CO2 24     Creatinine 0.8     Differential Method Automated     eGFR if African American >60.0     eGFR if non  >60.0  Comment:  Calculation used to obtain the estimated glomerular filtration  rate (eGFR) is the CKD-EPI equation.        Eos # 0.0     Eosinophil% 0.0     Glucose 85     Gran # (ANC) 5.7     Gran% 86.1     Hematocrit 31.1     Hemoglobin 10.1     Lymph # 0.7     Lymph% 10.4     Magnesium 1.7     MCH 27.5     MCHC 32.5     MCV 85     Mono # 0.2     Mono% 3.0     MPV 11.5     nRBC 0     Phosphorus 2.1     Platelets 379     Potassium 3.5     Total Protein 7.5     RBC 3.67     RDW 15.9     Sodium 135     WBC 6.56         All pertinent labs within the past 24 hours have been reviewed.    Significant Imaging: I have reviewed all pertinent imaging results/findings within the past 24 hours.    Assessment/Plan:      Hypokalemia    K 2.6 on admission 1/18/19 with EKG showing U waves in v4-v5 with early T wave depression  - KCl repletion with 30mEq IVPB and 20mEq PO  given per primary team (1/18)  - Repeat K this morning 2.8, repeat EKG shows resolution of hypokalemic changes  - Mg level came back 1.1, 2g IV Mg given  - Additional KCl / Kphos replacement given IV and PO  - Repeat CMP pending noon today, will adjust K replacement regimen PRN    1/20 -- K now 3.5, continue PRN PO replacement     Hypothyroidism    Hx thyroid cancer s/p thyroidectomy (1994) now on long term levothyroxine  - TSH 12.5, Free T4 1.14, consistent with subclinical hypothyroidism  - Pt takes levothyroxine daily 30-60mins before breakfast, denies missing doses  - Recently had dose increased from 225mcg to 250mcg daily  - Given TSH can take 6-8 weeks to normalize following changes in thyroid hormone levels, will continue on current levothyroxine 250mcg and f/u TSH levels in ~1 month       Depression    Continue home venlafaxine 75mg daily       Hypercalcemia    Corrected calcium 11.6 on admit, but 10.5 corrected on repeat 1/19  - Albumin 2.7, Phos 1.6  - Consider hypercalcemia of malignancy in the setting of bone mets  - PTH 36, 25-hydroxy vitamin D 29  - Pt currently taking long term vitamin D supplement 2000 U daily, will trend Calcium. VitD helpful for concomitant hypophosphatemia.   - Continue Phos replacement PRN  1/20 -- Calcium now normalized without intervention, possibly transient hypercalcemia of immobilization       Cancer of spine    Per NSGY         VTE Risk Mitigation (From admission, onward)        Ordered     heparin (porcine) injection 5,000 Units  Every 8 hours      01/18/19 0665     Place KENNEY hose  Until discontinued      01/18/19 0815     Place sequential compression device  Until discontinued      01/18/19 0815     IP VTE HIGH RISK PATIENT  Once      01/18/19 0815              Scott Logan MD  Department of Hospital Medicine   [facility]

## 2019-01-20 NOTE — ASSESSMENT & PLAN NOTE
Corrected calcium 11.6 on admit, but 10.5 corrected on repeat 1/19  - Albumin 2.7, Phos 1.6  - Consider hypercalcemia of malignancy in the setting of bone mets  - PTH 36, 25-hydroxy vitamin D 29  - Pt currently taking long term vitamin D supplement 2000 U daily, will trend Calcium. VitD helpful for concomitant hypophosphatemia.   - Continue Phos replacement PRN  1/20 -- Calcium now normalized without intervention, possibly transient hypercalcemia of immobilization

## 2019-01-20 NOTE — SUBJECTIVE & OBJECTIVE
Interval history: NAEO, no new medical concerns or complaints at this time.     Review of patient's allergies indicates:   Allergen Reactions    Ace inhibitors Swelling     Tongue swells     Contrast media Rash       No current facility-administered medications on file prior to encounter.      Current Outpatient Medications on File Prior to Encounter   Medication Sig    aspirin 81 MG Chew Take 81 mg by mouth once daily.    cetirizine (ZYRTEC) 10 MG tablet Take 10 mg by mouth once daily.    docusate calcium (SURFAK) 240 mg capsule Take 240 mg by mouth 2 (two) times daily as needed for Constipation (once to twice daily as needed for constipation).    ergocalciferol (ERGOCALCIFEROL) 50,000 unit Cap Take 50,000 Units by mouth every 7 days.    levothyroxine (SYNTHROID) 200 MCG tablet Take 200 mcg by mouth once daily.    levothyroxine (SYNTHROID) 50 MCG tablet Take 50 mcg by mouth once daily.    NIFEdipine (ADALAT CC) 90 MG TbSR Take 90 mg by mouth once daily.    oxyCODONE (OXYCONTIN) 10 mg 12 hr tablet Take 10 mg by mouth every 12 (twelve) hours as needed for Pain.    oxyCODONE-acetaminophen (PERCOCET) 5-325 mg per tablet Take 1 tablet by mouth every 4 (four) hours as needed for Pain.    ranitidine (ZANTAC) 150 MG tablet Take 150 mg by mouth 2 (two) times daily.    venlafaxine (EFFEXOR-XR) 150 MG Cp24 Take 75 mg by mouth once daily.    vitamin D (VITAMIN D3) 1000 units Tab Take 2,000 Units by mouth once daily.     Family History     None        Tobacco Use    Smoking status: Not on file   Substance and Sexual Activity    Alcohol use: Not on file    Drug use: Not on file    Sexual activity: Not on file     Review of Systems   Constitutional: Positive for appetite change, fatigue and unexpected weight change (30lbs wt loss in 6 wks). Negative for chills and fever.        Lethargic appearance, but in no acute distress lying in bed with C-collar in situ.    HENT: Negative for postnasal drip, rhinorrhea,  sinus pressure and sinus pain.    Eyes: Negative for photophobia and visual disturbance.   Respiratory: Negative for cough, shortness of breath and wheezing.    Cardiovascular: Negative for chest pain, palpitations and leg swelling.   Gastrointestinal: Positive for nausea. Negative for diarrhea and vomiting.   Genitourinary: Negative for decreased urine volume, dysuria, flank pain, hematuria and urgency.   Musculoskeletal: Positive for arthralgias, back pain and neck pain. Negative for neck stiffness.        Generalized pain of large joints worse on articulation in the setting of diffuse metastatic bone disease   Neurological: Positive for weakness. Negative for dizziness, light-headedness and headaches.   Psychiatric/Behavioral: Negative for confusion and decreased concentration.     Objective:     Vital Signs (Most Recent):  Temp: 97.7 °F (36.5 °C) (01/20/19 1205)  Pulse: 100 (01/20/19 1205)  Resp: 18 (01/20/19 1205)  BP: 118/82 (01/20/19 1205)  SpO2: (!) 94 % (01/20/19 1205) Vital Signs (24h Range):  Temp:  [97.3 °F (36.3 °C)-99.1 °F (37.3 °C)] 97.7 °F (36.5 °C)  Pulse:  [] 100  Resp:  [17-18] 18  SpO2:  [93 %-99 %] 94 %  BP: (113-132)/(77-88) 118/82     Weight: 79.9 kg (176 lb 2.4 oz)  Body mass index is 29.31 kg/m².    Physical Exam   Constitutional: She is oriented to person, place, and time. She appears well-developed and well-nourished. No distress.   HENT:   Head: Normocephalic and atraumatic.   Eyes: Conjunctivae are normal. No scleral icterus.   Neck: Neck supple. No JVD present. No tracheal deviation present.   Cardiovascular: Normal rate, regular rhythm, normal heart sounds and intact distal pulses. Exam reveals no gallop and no friction rub.   No murmur heard.  Pulmonary/Chest: Effort normal and breath sounds normal. No stridor. No respiratory distress. She has no wheezes. She has no rales.   Abdominal: Soft. Bowel sounds are normal. She exhibits no mass. There is no tenderness. There is no  rebound and no guarding.   Musculoskeletal: She exhibits tenderness (Generalized large joint tenderness/arthralgias worse on articulation). She exhibits no edema.   Neurological: She is alert and oriented to person, place, and time.   Skin: Skin is warm and dry. She is not diaphoretic.   Psychiatric: She has a normal mood and affect. Her behavior is normal.   Vitals reviewed.      Significant Labs:   Recent Lab Results       01/20/19  0534        Immature Granulocytes 0.3     Immature Grans (Abs) 0.02  Comment:  Mild elevation in immature granulocytes is non specific and   can be seen in a variety of conditions including stress response,   acute inflammation, trauma and pregnancy. Correlation with other   laboratory and clinical findings is essential.       Albumin 3.2     Alkaline Phosphatase 124     ALT 24     Anion Gap 11     AST 63     Baso # 0.01     Basophil% 0.2     Total Bilirubin 0.3  Comment:  For infants and newborns, interpretation of results should be based  on gestational age, weight and in agreement with clinical  observations.  Premature Infant recommended reference ranges:  Up to 24 hours.............<8.0 mg/dL  Up to 48 hours............<12.0 mg/dL  3-5 days..................<15.0 mg/dL  6-29 days.................<15.0 mg/dL       BUN, Bld 12     Calcium 9.4     Chloride 100     CO2 24     Creatinine 0.8     Differential Method Automated     eGFR if African American >60.0     eGFR if non  >60.0  Comment:  Calculation used to obtain the estimated glomerular filtration  rate (eGFR) is the CKD-EPI equation.        Eos # 0.0     Eosinophil% 0.0     Glucose 85     Gran # (ANC) 5.7     Gran% 86.1     Hematocrit 31.1     Hemoglobin 10.1     Lymph # 0.7     Lymph% 10.4     Magnesium 1.7     MCH 27.5     MCHC 32.5     MCV 85     Mono # 0.2     Mono% 3.0     MPV 11.5     nRBC 0     Phosphorus 2.1     Platelets 379     Potassium 3.5     Total Protein 7.5     RBC 3.67     RDW 15.9     Sodium  135     WBC 6.56         All pertinent labs within the past 24 hours have been reviewed.    Significant Imaging: I have reviewed all pertinent imaging results/findings within the past 24 hours.

## 2019-01-20 NOTE — ASSESSMENT & PLAN NOTE
Hx thyroid cancer s/p thyroidectomy (1994) now on long term levothyroxine  - TSH 12.5, Free T4 1.14, consistent with subclinical hypothyroidism  - Pt takes levothyroxine daily 30-60mins before breakfast, denies missing doses  - Recently had dose increased from 225mcg to 250mcg daily  - Given TSH can take 6-8 weeks to normalize following changes in thyroid hormone levels, will continue on current levothyroxine 250mcg and f/u TSH levels in ~1 month

## 2019-01-21 PROBLEM — C79.51 SECONDARY MALIGNANT NEOPLASM OF BONE: Status: ACTIVE | Noted: 2019-01-18

## 2019-01-21 LAB
BASOPHILS # BLD AUTO: 0.03 K/UL
BASOPHILS NFR BLD: 0.5 %
DIFFERENTIAL METHOD: ABNORMAL
EOSINOPHIL # BLD AUTO: 0 K/UL
EOSINOPHIL NFR BLD: 0.2 %
ERYTHROCYTE [DISTWIDTH] IN BLOOD BY AUTOMATED COUNT: 16.3 %
HCT VFR BLD AUTO: 31.3 %
HGB BLD-MCNC: 10.1 G/DL
IMM GRANULOCYTES # BLD AUTO: 0.02 K/UL
IMM GRANULOCYTES NFR BLD AUTO: 0.3 %
LYMPHOCYTES # BLD AUTO: 1.3 K/UL
LYMPHOCYTES NFR BLD: 19.3 %
MAGNESIUM SERPL-MCNC: 1.6 MG/DL
MCH RBC QN AUTO: 26.8 PG
MCHC RBC AUTO-ENTMCNC: 32.3 G/DL
MCV RBC AUTO: 83 FL
MONOCYTES # BLD AUTO: 0.5 K/UL
MONOCYTES NFR BLD: 6.9 %
NEUTROPHILS # BLD AUTO: 4.8 K/UL
NEUTROPHILS NFR BLD: 72.8 %
NRBC BLD-RTO: 0 /100 WBC
PHOSPHATE SERPL-MCNC: 1.2 MG/DL
PLATELET # BLD AUTO: 375 K/UL
PMV BLD AUTO: 11.7 FL
RBC # BLD AUTO: 3.77 M/UL
WBC # BLD AUTO: 6.53 K/UL

## 2019-01-21 PROCEDURE — 63600175 PHARM REV CODE 636 W HCPCS: Performed by: PHYSICIAN ASSISTANT

## 2019-01-21 PROCEDURE — 99253 IP/OBS CNSLTJ NEW/EST LOW 45: CPT | Mod: ,,, | Performed by: INTERNAL MEDICINE

## 2019-01-21 PROCEDURE — 99232 SBSQ HOSP IP/OBS MODERATE 35: CPT | Mod: ,,, | Performed by: PHYSICIAN ASSISTANT

## 2019-01-21 PROCEDURE — 20600001 HC STEP DOWN PRIVATE ROOM

## 2019-01-21 PROCEDURE — 99254 PR INITIAL INPATIENT CONSULT,LEVL IV: ICD-10-PCS | Mod: ,,, | Performed by: RADIOLOGY

## 2019-01-21 PROCEDURE — 85025 COMPLETE CBC W/AUTO DIFF WBC: CPT

## 2019-01-21 PROCEDURE — 25000003 PHARM REV CODE 250: Performed by: PHYSICIAN ASSISTANT

## 2019-01-21 PROCEDURE — 99253 PR INITIAL INPATIENT CONSULT,LEVL III: ICD-10-PCS | Mod: ,,, | Performed by: INTERNAL MEDICINE

## 2019-01-21 PROCEDURE — 25000003 PHARM REV CODE 250: Performed by: STUDENT IN AN ORGANIZED HEALTH CARE EDUCATION/TRAINING PROGRAM

## 2019-01-21 PROCEDURE — 99232 PR SUBSEQUENT HOSPITAL CARE,LEVL II: ICD-10-PCS | Mod: ,,, | Performed by: PHYSICIAN ASSISTANT

## 2019-01-21 PROCEDURE — 83735 ASSAY OF MAGNESIUM: CPT

## 2019-01-21 PROCEDURE — 36415 COLL VENOUS BLD VENIPUNCTURE: CPT

## 2019-01-21 PROCEDURE — 99254 IP/OBS CNSLTJ NEW/EST MOD 60: CPT | Mod: ,,, | Performed by: RADIOLOGY

## 2019-01-21 PROCEDURE — 84100 ASSAY OF PHOSPHORUS: CPT

## 2019-01-21 RX ORDER — CYCLOBENZAPRINE HCL 5 MG
5 TABLET ORAL 3 TIMES DAILY PRN
Status: DISCONTINUED | OUTPATIENT
Start: 2019-01-21 | End: 2019-01-24 | Stop reason: HOSPADM

## 2019-01-21 RX ADMIN — HEPARIN SODIUM 5000 UNITS: 5000 INJECTION, SOLUTION INTRAVENOUS; SUBCUTANEOUS at 10:01

## 2019-01-21 RX ADMIN — FAMOTIDINE 20 MG: 20 TABLET ORAL at 10:01

## 2019-01-21 RX ADMIN — OXYCODONE HYDROCHLORIDE AND ACETAMINOPHEN 1 TABLET: 5; 325 TABLET ORAL at 04:01

## 2019-01-21 RX ADMIN — VITAMIN D, TAB 1000IU (100/BT) 2000 UNITS: 25 TAB at 09:01

## 2019-01-21 RX ADMIN — DIBASIC SODIUM PHOSPHATE, MONOBASIC POTASSIUM PHOSPHATE AND MONOBASIC SODIUM PHOSPHATE 1 TABLET: 852; 155; 130 TABLET ORAL at 09:01

## 2019-01-21 RX ADMIN — MAGNESIUM OXIDE TAB 400 MG (241.3 MG ELEMENTAL MG) 400 MG: 400 (241.3 MG) TAB at 09:01

## 2019-01-21 RX ADMIN — LEVOTHYROXINE SODIUM 250 MCG: 125 TABLET ORAL at 05:01

## 2019-01-21 RX ADMIN — OXYCODONE HYDROCHLORIDE AND ACETAMINOPHEN 1 TABLET: 5; 325 TABLET ORAL at 02:01

## 2019-01-21 RX ADMIN — OXYCODONE HYDROCHLORIDE 10 MG: 10 TABLET, FILM COATED, EXTENDED RELEASE ORAL at 10:01

## 2019-01-21 RX ADMIN — HEPARIN SODIUM 5000 UNITS: 5000 INJECTION, SOLUTION INTRAVENOUS; SUBCUTANEOUS at 05:01

## 2019-01-21 RX ADMIN — NIFEDIPINE 90 MG: 30 TABLET, FILM COATED, EXTENDED RELEASE ORAL at 09:01

## 2019-01-21 RX ADMIN — VENLAFAXINE HYDROCHLORIDE 75 MG: 75 CAPSULE, EXTENDED RELEASE ORAL at 09:01

## 2019-01-21 RX ADMIN — HEPARIN SODIUM 5000 UNITS: 5000 INJECTION, SOLUTION INTRAVENOUS; SUBCUTANEOUS at 04:01

## 2019-01-21 RX ADMIN — SENNOSIDES AND DOCUSATE SODIUM 1 TABLET: 8.6; 5 TABLET ORAL at 09:01

## 2019-01-21 RX ADMIN — OXYCODONE HYDROCHLORIDE 10 MG: 10 TABLET, FILM COATED, EXTENDED RELEASE ORAL at 09:01

## 2019-01-21 RX ADMIN — FAMOTIDINE 20 MG: 20 TABLET ORAL at 09:01

## 2019-01-21 RX ADMIN — SENNOSIDES AND DOCUSATE SODIUM 1 TABLET: 8.6; 5 TABLET ORAL at 10:01

## 2019-01-21 RX ADMIN — CYCLOBENZAPRINE HYDROCHLORIDE 5 MG: 5 TABLET, FILM COATED ORAL at 10:01

## 2019-01-21 RX ADMIN — DIBASIC SODIUM PHOSPHATE, MONOBASIC POTASSIUM PHOSPHATE AND MONOBASIC SODIUM PHOSPHATE 1 TABLET: 852; 155; 130 TABLET ORAL at 10:01

## 2019-01-21 NOTE — ASSESSMENT & PLAN NOTE
This is a 54 y/o female with prior h/o Left breast cancer s/p breast conservation therapy completed in 2008 followed by adjuvant hormonal therapy. 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 She was transferred to [facility] 1/18/19 for consideration of neurosurgical intervention for spine metastases. Radiation Oncology is consulted for consideration of treatment options.     I discussed treatment options available for cancer that has spread to the bone including surgery, radiation, and systemic therapy. At this time she does not appear to have emergent cord compression; her symptom seems primarily pain from bone metastases. I recommend treating the pelvic, lumbosacral, and possibly clival metastases to 30 Gy in 10 fractions with intent to relieve pain and prevent neurologic progression. This treatment can be delivered as an outpatient closer to home (I believe [facility] has their own staff Radiation Oncologist) if she is otherwise safe to transfer back or discharge. If the primary team anticipates she will be transferred back within the next few days, then she would prefer to undergo radiation treatment closer to home.

## 2019-01-21 NOTE — ASSESSMENT & PLAN NOTE
53 year old female with a PMHx thyroid cancer s/p thyroidectomy, ER/NV+, her 2 (-) breast cancer, who [place] [facility] [place] [facility] with a newly diagnosed clival and cervical spine met.   -Patient neurologically stable on exam  -MRI C/T/L spine with diffuse bony metastasis throughout spine, but without any cord compression  -C collar at all times   -Will order TLSO brace for pathologic fractures of L2, L3, S1  -Brain MRI negative for metastasis  -Hem/Onc consulted - agree with outside oncologist plans for endocrine therapy & likely Ibrance, appreciate recs  -Rad/Onc consulted - recommend treating pelvic, lumbosacral, and possibly clival metastases to 30 Gy in 10 fractions with intent to relieve pain and prevent neurologic progression, which should be able to be done at [facility]  -HTN: Continue home dose of Nifedipine  -Hypothyroidism: Continue home dose of Synthroid  -Depression/Anxiety: Continue home dose of Venlafaxine  -DVT prophylaxis: KENNEY's, SCD's, and SQH  -Atelectasis prevention: IS hourly while awake  -Bowel regimen: Senna BID  -Potential discharge home tomorrow if can set up rad/onc, hem/onc   -Discussed with Dr. Shearer

## 2019-01-21 NOTE — SUBJECTIVE & OBJECTIVE
Oncology Treatment Plan:   [No treatment plan]    Current Facility-Administered Medications   Medication    diphenhydrAMINE capsule 50 mg    famotidine tablet 20 mg    heparin (porcine) injection 5,000 Units    k phos di & mono-sod phos mono 250 mg tablet 1 tablet    levothyroxine tablet 250 mcg    magnesium oxide tablet 400 mg    NIFEdipine 24 hr tablet 90 mg    oxyCODONE 12 hr tablet 10 mg    oxyCODONE-acetaminophen 5-325 mg per tablet 1 tablet    senna-docusate 8.6-50 mg per tablet 1 tablet    venlafaxine 24 hr capsule 75 mg    vitamin D 1000 units tablet 2,000 Units       Review of patient's allergies indicates:   Allergen Reactions    Ace inhibitors Swelling     Tongue swells     Contrast media Rash        History reviewed. No pertinent past medical history.  History reviewed. No pertinent surgical history.  Family History     None        Tobacco Use    Smoking status: Not on file   Substance and Sexual Activity    Alcohol use: Not on file    Drug use: Not on file    Sexual activity: Not on file       Review of Systems   All other systems reviewed and are negative.    Objective:     Vital Signs (Most Recent):  Temp: 99.1 °F (37.3 °C) (01/21/19 0756)  Pulse: 100 (01/21/19 0756)  Resp: 18 (01/21/19 0756)  BP: (!) 131/93 (01/21/19 0756)  SpO2: 98 % (01/21/19 0756) Vital Signs (24h Range):  Temp:  [97.7 °F (36.5 °C)-99.1 °F (37.3 °C)] 99.1 °F (37.3 °C)  Pulse:  [] 100  Resp:  [18-20] 18  SpO2:  [94 %-98 %] 98 %  BP: (118-137)/(82-93) 131/93     Weight: (79.9)  Body mass index is 29.31 kg/m².  Body surface area is 1.91 meters squared.    Physical Exam   Constitutional: She is oriented to person, place, and time. She appears well-developed and well-nourished.   HENT:   Head: Normocephalic and atraumatic.   Mouth/Throat: Mucous membranes are normal.   Eyes: EOM are normal. Pupils are equal, round, and reactive to light. No scleral icterus.   Neck:   Wearing aspen collar   Pulmonary/Chest:  Effort normal. No accessory muscle usage. No respiratory distress.   Abdominal: Soft. Normal appearance.   Musculoskeletal: Normal range of motion. She exhibits no edema.   Lymphadenopathy:     She has no cervical adenopathy.        Right: No supraclavicular adenopathy present.        Left: No supraclavicular adenopathy present.   Neurological: She is alert and oriented to person, place, and time. She has normal strength. No cranial nerve deficit. Gait normal.   Skin: Skin is warm. No rash noted. No cyanosis.   Psychiatric: She has a normal mood and affect. Her speech is normal. Judgment normal.   Vitals reviewed.      Significant Labs:   All pertinent labs from the last 24 hours have been reviewed.    Diagnostic Results:  I have reviewed all pertinent imaging results/findings within the past 24 hours.

## 2019-01-21 NOTE — PLAN OF CARE
Problem: Adult Inpatient Plan of Care  Goal: Plan of Care Review  Outcome: Ongoing (interventions implemented as appropriate)  No acute events over night. VSS. Patient complains of pain 10/10 x1. PRN Oxy 5 administered with moderate relief. Patient continues to refuse bed alarm and family continues to assist patient to bedside commode without calling for assistance. Bed in low position, non slip socks on, call light & personal belongings within reach. Will continue to monitor.

## 2019-01-21 NOTE — PLAN OF CARE
GERALD following for DC needs. GERALD in communication with CM.    Janine Ayers, LMSW [facility]  [id number]

## 2019-01-21 NOTE — HPI
Ms. Beckwith is a 54 y/o female with h/o Left breast cancer s/p lumpectomy and adjuvant radiation therapy in 2008 in [place]. She relocated to [place] in 2008 as part of  service. She reports that she completed adjuvant Arimidex therapy and was only following with primary care as of last year. About 5 months ago she began experiencing hip pain, and in December 2018 she felt like her tongue was swelling. This prompted outside work-up at [facility] that revealed breast cancer metastatic to bone. She was transferred to [facility] 1/18/19 for evaluation by Neurosurgery of spine metastases. MRI Brain and total spine demonstrated disease in the clivus, lumbar spine/sacrum, and bilateral pelvic bones. There was cervical canal stenosis from discs but no significant cord compression. CT C/A/P demonstrated likely bilateral pulmonary metastases. Due to extent of disease, surgical intervention was not recommended. Radiation Oncology was consulted for consideration of treatment options.     I met with the patient at her bedside. She reports her hip and lower back pain is under better control. She feels subjective proximal hip weakness but this is not significant on exam. She denies loss of bowel or bladder control. She denies numbness of the extremities.

## 2019-01-21 NOTE — SUBJECTIVE & OBJECTIVE
Interval History: Ms. Beckwith complains of LBP & minimal neck pain.  No new weakness, paresthesias, or B/B dysfunction.     Medications:  Continuous Infusions:  Scheduled Meds:   famotidine  20 mg Oral BID    heparin (porcine)  5,000 Units Subcutaneous Q8H    k phos di & mono-sod phos mono  1 tablet Oral BID    levothyroxine  250 mcg Oral Before breakfast    magnesium oxide  400 mg Oral Daily    NIFEdipine  90 mg Oral Daily    oxyCODONE  10 mg Oral Q12H    senna-docusate 8.6-50 mg  1 tablet Oral BID    venlafaxine  75 mg Oral Daily    vitamin D  2,000 Units Oral Daily     PRN Meds:diphenhydrAMINE, oxyCODONE-acetaminophen     Review of Systems  Objective:     Weight: (79.9)  Body mass index is 29.31 kg/m².  Vital Signs (Most Recent):  Temp: 97.8 °F (36.6 °C) (01/21/19 1542)  Pulse: 80 (01/21/19 1542)  Resp: 16 (01/21/19 1542)  BP: 120/79 (01/21/19 1542)  SpO2: 98 % (01/21/19 1542) Vital Signs (24h Range):  Temp:  [96.2 °F (35.7 °C)-99.1 °F (37.3 °C)] 97.8 °F (36.6 °C)  Pulse:  [] 80  Resp:  [16-20] 16  SpO2:  [95 %-98 %] 98 %  BP: (120-137)/(75-93) 120/79          Neurosurgery Physical Exam   General: well developed, well nourished, no distress  Head: normocephalic, atraumatic  Neurologic: Alert and oriented. Thought content appropriate  GCS: Motor: 6/Verbal: 5/Eyes: 4 GCS Total: 15  Mental Status: Awake, Alert, Oriented x 4  Language: No aphasia  Speech: No dysarthria  Cranial nerves: face symmetric, tongue midline, CN II-XII grossly intact.   Eyes: pupils equal, round, reactive to light with accommodation, EOMI  Pulmonary: normal respirations, not labored, no accessory muscles used  Abdomen: soft, non-distended, not tender to palpation  Sensory: intact to light touch throughout  Motor Strength: Moves all extremities spontaneously with good tone.  Full strength upper and lower extremities. No abnormal movements seen.     Strength  Deltoids Triceps Biceps Wrist Extension Wrist Flexion Hand     Upper: R 5/5 5/5 5/5 5/5 5/5 5/5    L 5/5 5/5 5/5 5/5 5/5 5/5     Iliopsoas Quadriceps Knee  Flexion Tibialis  anterior Gastro- cnemius EHL   Lower: R 4+/5 5/5 5/5 5/5 5/5 5/5    L 4+/5 5/5 5/5 5/5 5/5 5/5     HF weakness limited by pain  Pronator Drift: no drift noted  Finger-to-nose: Intact bilaterally  Ruvalcaba: absent  Clonus: absent  Babinski: absent  Pulses: 2+ and symmetric radial and dorsalis pedis  Skin: warm, dry and intact, no rashes        Significant Labs:  Recent Labs   Lab 01/20/19 0534 01/21/19  0434   GLU 85  --    *  --    K 3.5  --      --    CO2 24  --    BUN 12  --    CREATININE 0.8  --    CALCIUM 9.4  --    MG 1.7 1.6     Recent Labs   Lab 01/20/19 0534 01/21/19  0434   WBC 6.56 6.53   HGB 10.1* 10.1*   HCT 31.1* 31.3*   * 375*     No results for input(s): LABPT, INR, APTT in the last 48 hours.  Microbiology Results (last 7 days)     ** No results found for the last 168 hours. **          Significant Diagnostics:  No new imaging

## 2019-01-21 NOTE — PROGRESS NOTES
[facility]  Neurosurgery  Progress Note    Subjective:     History of Present Illness: Ms. Chen Beckwith is a 53 year old female with a PMHx of recently diagnosed metastatic breast cancer in 12/2016, who presents to [facility] as a transfer from [facility] for Neurosurgical evaluation. She originally presented to [facility] on 12/19/18 due to severe bilateral hip pain that limited her ability to walk. Imaging showed multiple mets in her hips and abdominal cavity. She presented back to [facility] on 1/15/19 to complete the scans of the brain and neck. At that time, it was discovered that her Ca levels were greatly increased so she was admitted. Once the imaging had resulted she was seen to have a clival mass and a C1 mass. She was transferred to [facility]. She reports constant neck, deltoid, back, hip, groin, and leg pain that worsens with movement or activity. She states that the neck pain has improved with the cervical collar. She denies any vision changes, seizure like activity, bladder/bowel incontinence, or symptoms of urinary retention. Denies any difficulty using her hands or dropping items frequently. Denies any use of anti coagulation.     Post-Op Info:  * No surgery found *         Interval History: Ms. Beckwith complains of LBP & minimal neck pain.  No new weakness, paresthesias, or B/B dysfunction.     Medications:  Continuous Infusions:  Scheduled Meds:   famotidine  20 mg Oral BID    heparin (porcine)  5,000 Units Subcutaneous Q8H    k phos di & mono-sod phos mono  1 tablet Oral BID    levothyroxine  250 mcg Oral Before breakfast    magnesium oxide  400 mg Oral Daily    NIFEdipine  90 mg Oral Daily    oxyCODONE  10 mg Oral Q12H    senna-docusate 8.6-50 mg  1 tablet Oral BID    venlafaxine  75 mg Oral Daily    vitamin D  2,000 Units Oral Daily     PRN Meds:diphenhydrAMINE, oxyCODONE-acetaminophen     Review of Systems  Objective:     Weight: (79.9)  Body mass index is 29.31  kg/m².  Vital Signs (Most Recent):  Temp: 97.8 °F (36.6 °C) (01/21/19 1542)  Pulse: 80 (01/21/19 1542)  Resp: 16 (01/21/19 1542)  BP: 120/79 (01/21/19 1542)  SpO2: 98 % (01/21/19 1542) Vital Signs (24h Range):  Temp:  [96.2 °F (35.7 °C)-99.1 °F (37.3 °C)] 97.8 °F (36.6 °C)  Pulse:  [] 80  Resp:  [16-20] 16  SpO2:  [95 %-98 %] 98 %  BP: (120-137)/(75-93) 120/79          Neurosurgery Physical Exam   General: well developed, well nourished, no distress  Head: normocephalic, atraumatic  Neurologic: Alert and oriented. Thought content appropriate  GCS: Motor: 6/Verbal: 5/Eyes: 4 GCS Total: 15  Mental Status: Awake, Alert, Oriented x 4  Language: No aphasia  Speech: No dysarthria  Cranial nerves: face symmetric, tongue midline, CN II-XII grossly intact.   Eyes: pupils equal, round, reactive to light with accommodation, EOMI  Pulmonary: normal respirations, not labored, no accessory muscles used  Abdomen: soft, non-distended, not tender to palpation  Sensory: intact to light touch throughout  Motor Strength: Moves all extremities spontaneously with good tone.  Full strength upper and lower extremities. No abnormal movements seen.     Strength  Deltoids Triceps Biceps Wrist Extension Wrist Flexion Hand    Upper: R 5/5 5/5 5/5 5/5 5/5 5/5    L 5/5 5/5 5/5 5/5 5/5 5/5     Iliopsoas Quadriceps Knee  Flexion Tibialis  anterior Gastro- cnemius EHL   Lower: R 4+/5 5/5 5/5 5/5 5/5 5/5    L 4+/5 5/5 5/5 5/5 5/5 5/5     HF weakness limited by pain  Pronator Drift: no drift noted  Finger-to-nose: Intact bilaterally  Ruvalcaba: absent  Clonus: absent  Babinski: absent  Pulses: 2+ and symmetric radial and dorsalis pedis  Skin: warm, dry and intact, no rashes        Significant Labs:  Recent Labs   Lab 01/20/19  0534 01/21/19  0434   GLU 85  --    *  --    K 3.5  --      --    CO2 24  --    BUN 12  --    CREATININE 0.8  --    CALCIUM 9.4  --    MG 1.7 1.6     Recent Labs   Lab 01/20/19  0534 01/21/19  0434   WBC 6.56  6.53   HGB 10.1* 10.1*   HCT 31.1* 31.3*   * 375*     No results for input(s): LABPT, INR, APTT in the last 48 hours.  Microbiology Results (last 7 days)     ** No results found for the last 168 hours. **          Significant Diagnostics:  No new imaging    Assessment/Plan:     * Secondary malignant neoplasm of bone    53 year old female with a PMHx thyroid cancer s/p thyroidectomy, ER/AZ+, her 2 (-) breast cancer, who presents to [facility] as a transfer from [facility] with a newly diagnosed clival and cervical spine met.   -Patient neurologically stable on exam  -MRI C/T/L spine with diffuse bony metastasis throughout spine, but without any cord compression  -C collar at all times   -Will order TLSO brace for pathologic fractures of L2, L3, S1  -Brain MRI negative for metastasis  -Hem/Onc consulted - agree with outside oncologist plans for endocrine therapy & likely Ibrance, appreciate recs  -Rad/Onc consulted - recommend treating pelvic, lumbosacral, and possibly clival metastases to 30 Gy in 10 fractions with intent to relieve pain and prevent neurologic progression, which should be able to be done at [facility]  -Flexeril 5mg TID PRN for spasm ordered  -HTN: Continue home dose of Nifedipine  -Hypothyroidism: Continue home dose of Synthroid  -Depression/Anxiety: Continue home dose of Venlafaxine  -DVT prophylaxis: KENNEY's, SCD's, and SQH  -Atelectasis prevention: IS hourly while awake  -Bowel regimen: Senna BID  -Potential discharge home tomorrow if can set up rad/onc, hem/onc   -Discussed with Dr. Manfred Diaz, PA-C  Neurosurgery  [facility]

## 2019-01-21 NOTE — CONSULTS
[facility] [address]  Radiation Oncology  Consult Note    Patient Name: Chen Beckwith  MRN: [id number]  Admission Date: 1/18/2019  Hospital Length of Stay: 3 days  Code Status: Full Code   Attending Provider: Ajith Shearer MD  Consulting Provider: Davis Dudley MD  Primary Care Physician: No primary care provider on file.  Principal Problem:Secondary malignant neoplasm of bone    Inpatient consult to Radiation Oncology  Consult performed by: Davis Dudley MD  Consult ordered by: Lizeth Diaz PA-C        Subjective:     HPI:  Ms. Beckwith is a 54 y/o female with h/o Left breast cancer s/p lumpectomy and adjuvant radiation therapy in 2008 in [place]. She relocated to [place] in 2008 as part of  service. She reports that she completed adjuvant Arimidex therapy and was only following with primary care as of last year. About 5 months ago she began experiencing hip pain, and in December 2018 she felt like her tongue was swelling. This prompted outside work-up at [facility] that revealed breast cancer metastatic to bone. She was transferred to [facility] 1/18/19 for evaluation by Neurosurgery of spine metastases. MRI Brain and total spine demonstrated disease in the clivus, lumbar spine/sacrum, and bilateral pelvic bones. There was cervical canal stenosis from discs but no significant cord compression. CT C/A/P demonstrated likely bilateral pulmonary metastases. Due to extent of disease, surgical intervention was not recommended. Radiation Oncology was consulted for consideration of treatment options.     I met with the patient at her bedside. She reports her hip and lower back pain is under better control. She feels subjective proximal hip weakness but this is not significant on exam. She denies loss of bowel or bladder control. She denies numbness of the extremities.      Oncology Treatment Plan:   [No treatment plan]    Current Facility-Administered Medications   Medication    diphenhydrAMINE capsule  50 mg    famotidine tablet 20 mg    heparin (porcine) injection 5,000 Units    k phos di & mono-sod phos mono 250 mg tablet 1 tablet    levothyroxine tablet 250 mcg    magnesium oxide tablet 400 mg    NIFEdipine 24 hr tablet 90 mg    oxyCODONE 12 hr tablet 10 mg    oxyCODONE-acetaminophen 5-325 mg per tablet 1 tablet    senna-docusate 8.6-50 mg per tablet 1 tablet    venlafaxine 24 hr capsule 75 mg    vitamin D 1000 units tablet 2,000 Units       Review of patient's allergies indicates:   Allergen Reactions    Ace inhibitors Swelling     Tongue swells     Contrast media Rash        History reviewed. No pertinent past medical history.  History reviewed. No pertinent surgical history.  Family History     None        Tobacco Use    Smoking status: Not on file   Substance and Sexual Activity    Alcohol use: Not on file    Drug use: Not on file    Sexual activity: Not on file       Review of Systems   All other systems reviewed and are negative.    Objective:     Vital Signs (Most Recent):  Temp: 99.1 °F (37.3 °C) (01/21/19 0756)  Pulse: 100 (01/21/19 0756)  Resp: 18 (01/21/19 0756)  BP: (!) 131/93 (01/21/19 0756)  SpO2: 98 % (01/21/19 0756) Vital Signs (24h Range):  Temp:  [97.7 °F (36.5 °C)-99.1 °F (37.3 °C)] 99.1 °F (37.3 °C)  Pulse:  [] 100  Resp:  [18-20] 18  SpO2:  [94 %-98 %] 98 %  BP: (118-137)/(82-93) 131/93     Weight: (79.9)  Body mass index is 29.31 kg/m².  Body surface area is 1.91 meters squared.    Physical Exam   Constitutional: She is oriented to person, place, and time. She appears well-developed and well-nourished.   HENT:   Head: Normocephalic and atraumatic.   Mouth/Throat: Mucous membranes are normal.   Eyes: EOM are normal. Pupils are equal, round, and reactive to light. No scleral icterus.   Neck:   Wearing aspen collar   Pulmonary/Chest: Effort normal. No accessory muscle usage. No respiratory distress.   Abdominal: Soft. Normal appearance.   Musculoskeletal: Normal range  of motion. She exhibits no edema.   Lymphadenopathy:     She has no cervical adenopathy.        Right: No supraclavicular adenopathy present.        Left: No supraclavicular adenopathy present.   Neurological: She is alert and oriented to person, place, and time. She has normal strength. No cranial nerve deficit. Gait normal.   Skin: Skin is warm. No rash noted. No cyanosis.   Psychiatric: She has a normal mood and affect. Her speech is normal. Judgment normal.   Vitals reviewed.      Significant Labs:   All pertinent labs from the last 24 hours have been reviewed.    Diagnostic Results:  I have reviewed all pertinent imaging results/findings within the past 24 hours.    Assessment/Plan:     * Secondary malignant neoplasm of bone    This is a 54 y/o female with prior h/o Left breast cancer s/p breast conservation therapy completed in 2008 followed by adjuvant hormonal therapy. 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 She was transferred to [facility] 1/18/19 for consideration of neurosurgical intervention for spine metastases. Radiation Oncology is consulted for consideration of treatment options.     I discussed treatment options available for cancer that has spread to the bone including surgery, radiation, and systemic therapy. At this time she does not appear to have emergent cord compression; her symptom seems primarily pain from bone metastases. I recommend treating the pelvic, lumbosacral, and possibly clival metastases to 30 Gy in 10 fractions with intent to relieve pain and prevent neurologic progression. This treatment can be delivered as an outpatient closer to home (I believe [facility] has their own staff Radiation Oncologist) if she is otherwise safe to transfer back or discharge. If the primary team anticipates she will be transferred back within the next few days, then she would prefer to undergo radiation treatment closer  to home.          Thank you for your consult. I will sign off. Please contact us if you have any additional questions.    Davis Dudley MD  Radiation Oncology  [facility]

## 2019-01-21 NOTE — NURSING
Patient refused bed alarm. Educated patient on fall risk status and to use call light for assistance. Verbalized understanding. Family at bedside. Visualized family helping patient to bedside commode without calling for assistance.

## 2019-01-21 NOTE — CONSULTS
Consult Note    Consults  SUBJECTIVE:     History of Present Illness:  Chen Beckwith is a 53-year-old female with previous thyroid cancer s/p thyroidectomy, ER/[place]+, her 2 (-) breast cancer, who presented to [facility] as a transfer from [facility] for Neurosurgical evaluation.     Oncologic history obtained from patient. and some of the outside records in patient's chart (which do not contain complete history). The patient was initially diagnosed with breast cancer in 2007, underwent lumpectomy, found to have +LN. She completed chemotherapy in 2008, followed by mastectomy and radiation for 6 weeks. She states was BRCA negative. Underwent hysterectomy. She was on tamoxifen for a few years followed by anastrozole. For the past few months she had been having leg pain, thought to have pinched nerve, and was undergoing physical therapy. She follows with an oncologist at [facility]. The patient presented to OSH with lower extremity weakness, no urinary or bowel incontinence/retention. She states she has some difficulty walking. At OSH she was found to have metastatic disease to spine, and hypercalcemia. Bone biopsy was done at OSH, and per patient was + for her breast cancer. At OSH, she received Zometa on 1/16/19, in addition to 500mg fulvestrant. Per OSH notes, it appears plan was to treat patient with fulvestrant and palbociclib.    Review of patient's allergies indicates:   Allergen Reactions    Ace inhibitors Swelling     Tongue swells     Contrast media Rash     History reviewed. No pertinent past medical history.  History reviewed. No pertinent surgical history.  History reviewed. No pertinent family history.  Social History     Tobacco Use    Smoking status: Not on file   Substance Use Topics    Alcohol use: Not on file    Drug use: Not on file     Review of Systems   Constitutional: Negative for chills and fever.   HENT: Negative for nosebleeds and sore throat.    Eyes: Negative for  blurred vision and double vision.   Respiratory: Negative for cough and hemoptysis.    Cardiovascular: Negative for chest pain and leg swelling.   Gastrointestinal: Negative for abdominal pain, blood in stool, melena, nausea and vomiting.   Genitourinary: Negative for hematuria.   Musculoskeletal: Positive for back pain and joint pain. Negative for myalgias.   Skin: Negative for rash.   Neurological: Positive for sensory change and focal weakness. Negative for dizziness and headaches.   Endo/Heme/Allergies: Does not bruise/bleed easily.     OBJECTIVE:     Vital Signs:  Temp:  [96.2 °F (35.7 °C)-99.1 °F (37.3 °C)]   Pulse:  []   Resp:  [18-20]   BP: (131-133)/(75-93)   SpO2:  [96 %-98 %]     Physical Exam   Constitutional: She is oriented to person, place, and time. She appears well-developed and well-nourished.   Eyes: EOM are normal.   Neck: Normal range of motion.   Cardiovascular: Normal rate and regular rhythm.   Pulmonary/Chest: Effort normal. No respiratory distress.   Abdominal: Soft. She exhibits no distension. There is no tenderness.   Musculoskeletal: She exhibits no edema.   Neurological: She is alert and oriented to person, place, and time.   4/5 right lower extremity  5/5 left lower extremity  Wearing aspen collar   Skin: Skin is warm and dry.   Psychiatric: She has a normal mood and affect. Her behavior is normal.     Laboratory:  CBC:   Recent Labs   Lab 01/21/19  0434   WBC 6.53   RBC 3.77*   HGB 10.1*   HCT 31.3*   *   MCV 83   MCH 26.8*   MCHC 32.3     CMP:   Recent Labs   Lab 01/20/19  0534   GLU 85   CALCIUM 9.4   ALBUMIN 3.2*   PROT 7.5   *   K 3.5   CO2 24      BUN 12   CREATININE 0.8   ALKPHOS 124   ALT 24   AST 63*   BILITOT 0.3       Diagnostic Results:      ASSESSMENT/PLAN:   IMPRESSION  1) Metastatic breast cancer to bone  -S/p fulvestrant at OSH  -No evidence of cord compression at this time    2) Hypercalcemia related to malignancy  -S/p Zometa at  OS    RECOMMENDATIONS  -Try to obtain pathology report for patient's bone biopsy  -Assuming biopsy is ER/[place]+ breast cancer as patient has stated, agree with plan to pursue fulvestrant and add palbociclib as outpatient. Patient should have close follow-up with her oncologist at [facility] once discharged for continuation of this treatment plan.  -Appreciate neurosurgery and radiation oncology recommendations. Per radiation oncology, recommend treating the pelvic, lumbosacral, and possibly clival metastases to 30 Gy in 10 fractions with intent to relieve pain and prevent neurologic progression.     The following was staffed and discussed with supervising physician Dr. Kirkpatrick. We will sign off. Please call [facility] number [phone] with questions, otherwise page the Oncology fellow on call.     Delisa Singer MD  Hematology/Oncology Fellow

## 2019-01-22 LAB
1,25(OH)2D3 SERPL-MCNC: 51 PG/ML
ANION GAP SERPL CALC-SCNC: 13 MMOL/L
BASOPHILS # BLD AUTO: 0.03 K/UL
BASOPHILS NFR BLD: 0.5 %
BUN SERPL-MCNC: 10 MG/DL
CALCIUM SERPL-MCNC: 9.2 MG/DL
CHLORIDE SERPL-SCNC: 105 MMOL/L
CO2 SERPL-SCNC: 24 MMOL/L
CREAT SERPL-MCNC: 0.8 MG/DL
DIFFERENTIAL METHOD: ABNORMAL
EOSINOPHIL # BLD AUTO: 0 K/UL
EOSINOPHIL NFR BLD: 0.4 %
ERYTHROCYTE [DISTWIDTH] IN BLOOD BY AUTOMATED COUNT: 16.5 %
EST. GFR  (AFRICAN AMERICAN): >60 ML/MIN/1.73 M^2
EST. GFR  (NON AFRICAN AMERICAN): >60 ML/MIN/1.73 M^2
GLUCOSE SERPL-MCNC: 79 MG/DL
HCT VFR BLD AUTO: 32.5 %
HGB BLD-MCNC: 10.3 G/DL
IMM GRANULOCYTES # BLD AUTO: 0.01 K/UL
IMM GRANULOCYTES NFR BLD AUTO: 0.2 %
LYMPHOCYTES # BLD AUTO: 1.4 K/UL
LYMPHOCYTES NFR BLD: 23.8 %
MCH RBC QN AUTO: 26.6 PG
MCHC RBC AUTO-ENTMCNC: 31.7 G/DL
MCV RBC AUTO: 84 FL
MONOCYTES # BLD AUTO: 0.4 K/UL
MONOCYTES NFR BLD: 6.7 %
NEUTROPHILS # BLD AUTO: 3.9 K/UL
NEUTROPHILS NFR BLD: 68.4 %
NRBC BLD-RTO: 0 /100 WBC
PLATELET # BLD AUTO: 387 K/UL
PMV BLD AUTO: 11.9 FL
POTASSIUM SERPL-SCNC: 3.5 MMOL/L
RBC # BLD AUTO: 3.87 M/UL
SODIUM SERPL-SCNC: 142 MMOL/L
WBC # BLD AUTO: 5.71 K/UL

## 2019-01-22 PROCEDURE — 85025 COMPLETE CBC W/AUTO DIFF WBC: CPT

## 2019-01-22 PROCEDURE — 25000003 PHARM REV CODE 250: Performed by: STUDENT IN AN ORGANIZED HEALTH CARE EDUCATION/TRAINING PROGRAM

## 2019-01-22 PROCEDURE — 25000003 PHARM REV CODE 250: Performed by: PHYSICIAN ASSISTANT

## 2019-01-22 PROCEDURE — 20600001 HC STEP DOWN PRIVATE ROOM

## 2019-01-22 PROCEDURE — 99233 SBSQ HOSP IP/OBS HIGH 50: CPT | Mod: ,,, | Performed by: PHYSICIAN ASSISTANT

## 2019-01-22 PROCEDURE — 99233 PR SUBSEQUENT HOSPITAL CARE,LEVL III: ICD-10-PCS | Mod: ,,, | Performed by: PHYSICIAN ASSISTANT

## 2019-01-22 PROCEDURE — 36415 COLL VENOUS BLD VENIPUNCTURE: CPT

## 2019-01-22 PROCEDURE — 80048 BASIC METABOLIC PNL TOTAL CA: CPT

## 2019-01-22 PROCEDURE — 63600175 PHARM REV CODE 636 W HCPCS: Performed by: PHYSICIAN ASSISTANT

## 2019-01-22 RX ADMIN — HEPARIN SODIUM 5000 UNITS: 5000 INJECTION, SOLUTION INTRAVENOUS; SUBCUTANEOUS at 09:01

## 2019-01-22 RX ADMIN — MAGNESIUM OXIDE TAB 400 MG (241.3 MG ELEMENTAL MG) 400 MG: 400 (241.3 MG) TAB at 09:01

## 2019-01-22 RX ADMIN — VENLAFAXINE HYDROCHLORIDE 75 MG: 75 CAPSULE, EXTENDED RELEASE ORAL at 09:01

## 2019-01-22 RX ADMIN — VITAMIN D, TAB 1000IU (100/BT) 2000 UNITS: 25 TAB at 09:01

## 2019-01-22 RX ADMIN — HEPARIN SODIUM 5000 UNITS: 5000 INJECTION, SOLUTION INTRAVENOUS; SUBCUTANEOUS at 05:01

## 2019-01-22 RX ADMIN — SENNOSIDES AND DOCUSATE SODIUM 1 TABLET: 8.6; 5 TABLET ORAL at 09:01

## 2019-01-22 RX ADMIN — CYCLOBENZAPRINE HYDROCHLORIDE 5 MG: 5 TABLET, FILM COATED ORAL at 09:01

## 2019-01-22 RX ADMIN — OXYCODONE HYDROCHLORIDE AND ACETAMINOPHEN 1 TABLET: 5; 325 TABLET ORAL at 02:01

## 2019-01-22 RX ADMIN — NIFEDIPINE 90 MG: 30 TABLET, FILM COATED, EXTENDED RELEASE ORAL at 09:01

## 2019-01-22 RX ADMIN — FAMOTIDINE 20 MG: 20 TABLET ORAL at 09:01

## 2019-01-22 RX ADMIN — OXYCODONE HYDROCHLORIDE 10 MG: 10 TABLET, FILM COATED, EXTENDED RELEASE ORAL at 09:01

## 2019-01-22 RX ADMIN — LEVOTHYROXINE SODIUM 250 MCG: 125 TABLET ORAL at 05:01

## 2019-01-22 RX ADMIN — DIBASIC SODIUM PHOSPHATE, MONOBASIC POTASSIUM PHOSPHATE AND MONOBASIC SODIUM PHOSPHATE 1 TABLET: 852; 155; 130 TABLET ORAL at 09:01

## 2019-01-22 RX ADMIN — OXYCODONE HYDROCHLORIDE AND ACETAMINOPHEN 1 TABLET: 5; 325 TABLET ORAL at 10:01

## 2019-01-22 RX ADMIN — CYCLOBENZAPRINE HYDROCHLORIDE 5 MG: 5 TABLET, FILM COATED ORAL at 07:01

## 2019-01-22 RX ADMIN — OXYCODONE HYDROCHLORIDE AND ACETAMINOPHEN 1 TABLET: 5; 325 TABLET ORAL at 12:01

## 2019-01-22 RX ADMIN — HEPARIN SODIUM 5000 UNITS: 5000 INJECTION, SOLUTION INTRAVENOUS; SUBCUTANEOUS at 03:01

## 2019-01-22 NOTE — SUBJECTIVE & OBJECTIVE
Interval History: NAEON. Pain controlled with current regimen. Reports L tricceleste feels weaker after sleeping on it wrong during the night, otherwise exam stable. Reports compliance with c collar. Denies b/b dysfunction.     Medications:  Continuous Infusions:  Scheduled Meds:   famotidine  20 mg Oral BID    heparin (porcine)  5,000 Units Subcutaneous Q8H    k phos di & mono-sod phos mono  1 tablet Oral BID    levothyroxine  250 mcg Oral Before breakfast    magnesium oxide  400 mg Oral Daily    NIFEdipine  90 mg Oral Daily    oxyCODONE  10 mg Oral Q12H    senna-docusate 8.6-50 mg  1 tablet Oral BID    venlafaxine  75 mg Oral Daily    vitamin D  2,000 Units Oral Daily     PRN Meds:cyclobenzaprine, diphenhydrAMINE, oxyCODONE-acetaminophen       Objective:     Weight: (79.9)  Body mass index is 29.31 kg/m².  Vital Signs (Most Recent):  Temp: 98.6 °F (37 °C) (01/22/19 1531)  Pulse: 90 (01/22/19 1546)  Resp: 16 (01/22/19 1531)  BP: 112/74 (01/22/19 1531)  SpO2: 96 % (01/22/19 1531) Vital Signs (24h Range):  Temp:  [97.9 °F (36.6 °C)-98.9 °F (37.2 °C)] 98.6 °F (37 °C)  Pulse:  [] 90  Resp:  [16-19] 16  SpO2:  [94 %-97 %] 96 %  BP: (112-133)/(74-89) 112/74     Date 01/22/19 0700 - 01/23/19 0659   Shift 3226-2993 3372-5078 2279-2654 24 Hour Total   INTAKE   P.O. 240   240   Shift Total(mL/kg) 240(3)   240(3)   OUTPUT   Shift Total(mL/kg)       Weight (kg) 79.9 79.9 79.9 79.9                   Neurosurgery Physical Exam    General: well developed, well nourished, no distress.   Head: normocephalic, atraumatic  Neurologic: Alert and oriented. Thought content appropriate.  GCS: Motor: 6/Verbal: 5/Eyes: 4 GCS Total: 15  Mental Status: Awake, Alert, Oriented x 4  Language: No aphasia  Speech: No dysarthria  Cranial nerves: face symmetric, tongue midline, CN II-XII grossly intact.   Eyes: pupils equal, round, reactive to light with accomodation, EOMI.   Pulmonary: normal respirations, no signs of respiratory  distress  Abdomen: soft, non-distended, not tender to palpation  Sensory: intact to light touch throughout  Motor Strength:Moves all extremities spontaneously with good tone.  Full strength upper and lower extremities. Pain limited weakness noted to L deltoid. No abnormal movements seen.     Strength  Deltoids Triceps Biceps Wrist Extension Wrist Flexion Hand    Upper: R 5/5 5/5 5/5 5/5 5/5 5/5    L 4+/5 5/5 5/5 5/5 5/5 5/5     Iliopsoas Quadriceps Knee  Flexion Tibialis  anterior Gastro- cnemius EHL   Lower: R 5/5 5/5 5/5 5/5 5/5 5/5    L 5/5 5/5 5/5 5/5 5/5 5/5     Ruvalcaba: absent  Clonus: absent  Babinski: absent  Pulses: 2+ and symmetric radial and dorsalis pedis.  Skin: Skin is warm, dry and intact.      Significant Labs:  Recent Labs   Lab 01/21/19  0434 01/22/19  0358   GLU  --  79   NA  --  142   K  --  3.5   CL  --  105   CO2  --  24   BUN  --  10   CREATININE  --  0.8   CALCIUM  --  9.2   MG 1.6  --      Recent Labs   Lab 01/21/19  0434 01/22/19  0358   WBC 6.53 5.71   HGB 10.1* 10.3*   HCT 31.3* 32.5*   * 387*     No results for input(s): LABPT, INR, APTT in the last 48 hours.  Microbiology Results (last 7 days)     ** No results found for the last 168 hours. **        Recent Lab Results       01/22/19 0358        Immature Granulocytes 0.2     Immature Grans (Abs) 0.01  Comment:  Mild elevation in immature granulocytes is non specific and   can be seen in a variety of conditions including stress response,   acute inflammation, trauma and pregnancy. Correlation with other   laboratory and clinical findings is essential.       Anion Gap 13     Baso # 0.03     Basophil% 0.5     BUN, Bld 10     Calcium 9.2     Chloride 105     CO2 24     Creatinine 0.8     Differential Method Automated     eGFR if African American >60.0     eGFR if non  >60.0  Comment:  Calculation used to obtain the estimated glomerular filtration  rate (eGFR) is the CKD-EPI equation.        Eos # 0.0      Eosinophil% 0.4     Glucose 79     Gran # (ANC) 3.9     Gran% 68.4     Hematocrit 32.5     Hemoglobin 10.3     Lymph # 1.4     Lymph% 23.8     MCH 26.6     MCHC 31.7     MCV 84     Mono # 0.4     Mono% 6.7     MPV 11.9     nRBC 0     Platelets 387     Potassium 3.5     RBC 3.87     RDW 16.5     Sodium 142     WBC 5.71         All pertinent labs from the last 24 hours have been reviewed.    Significant Diagnostics:  No new imaging to review.

## 2019-01-22 NOTE — PLAN OF CARE
SW following for DC needs. SW in communication with CM.    No SW needs identified at this time.     Janine Ayers, JLUIS  [facility]  [id number]

## 2019-01-23 LAB
ANION GAP SERPL CALC-SCNC: 13 MMOL/L
BASOPHILS # BLD AUTO: 0.06 K/UL
BASOPHILS NFR BLD: 1 %
BUN SERPL-MCNC: 10 MG/DL
CALCIUM SERPL-MCNC: 9.2 MG/DL
CHLORIDE SERPL-SCNC: 103 MMOL/L
CO2 SERPL-SCNC: 23 MMOL/L
CREAT SERPL-MCNC: 0.8 MG/DL
DIFFERENTIAL METHOD: ABNORMAL
EOSINOPHIL # BLD AUTO: 0 K/UL
EOSINOPHIL NFR BLD: 0.5 %
ERYTHROCYTE [DISTWIDTH] IN BLOOD BY AUTOMATED COUNT: 16.5 %
EST. GFR  (AFRICAN AMERICAN): >60 ML/MIN/1.73 M^2
EST. GFR  (NON AFRICAN AMERICAN): >60 ML/MIN/1.73 M^2
GLUCOSE SERPL-MCNC: 78 MG/DL
HCT VFR BLD AUTO: 32.9 %
HGB BLD-MCNC: 10.5 G/DL
IMM GRANULOCYTES # BLD AUTO: 0.03 K/UL
IMM GRANULOCYTES NFR BLD AUTO: 0.5 %
LYMPHOCYTES # BLD AUTO: 1.7 K/UL
LYMPHOCYTES NFR BLD: 27.7 %
MCH RBC QN AUTO: 26.6 PG
MCHC RBC AUTO-ENTMCNC: 31.9 G/DL
MCV RBC AUTO: 83 FL
MONOCYTES # BLD AUTO: 0.4 K/UL
MONOCYTES NFR BLD: 5.9 %
NEUTROPHILS # BLD AUTO: 3.9 K/UL
NEUTROPHILS NFR BLD: 64.4 %
NRBC BLD-RTO: 0 /100 WBC
PLATELET # BLD AUTO: 421 K/UL
PMV BLD AUTO: 11.7 FL
POTASSIUM SERPL-SCNC: 2.8 MMOL/L
RBC # BLD AUTO: 3.95 M/UL
SODIUM SERPL-SCNC: 139 MMOL/L
WBC # BLD AUTO: 6.06 K/UL

## 2019-01-23 PROCEDURE — 94761 N-INVAS EAR/PLS OXIMETRY MLT: CPT

## 2019-01-23 PROCEDURE — 80048 BASIC METABOLIC PNL TOTAL CA: CPT

## 2019-01-23 PROCEDURE — 25000003 PHARM REV CODE 250: Performed by: PHYSICIAN ASSISTANT

## 2019-01-23 PROCEDURE — 85025 COMPLETE CBC W/AUTO DIFF WBC: CPT

## 2019-01-23 PROCEDURE — 99232 SBSQ HOSP IP/OBS MODERATE 35: CPT | Mod: ,,, | Performed by: PHYSICIAN ASSISTANT

## 2019-01-23 PROCEDURE — 63600175 PHARM REV CODE 636 W HCPCS: Performed by: PHYSICIAN ASSISTANT

## 2019-01-23 PROCEDURE — 25000003 PHARM REV CODE 250: Performed by: STUDENT IN AN ORGANIZED HEALTH CARE EDUCATION/TRAINING PROGRAM

## 2019-01-23 PROCEDURE — 20600001 HC STEP DOWN PRIVATE ROOM

## 2019-01-23 PROCEDURE — 99232 PR SUBSEQUENT HOSPITAL CARE,LEVL II: ICD-10-PCS | Mod: ,,, | Performed by: PHYSICIAN ASSISTANT

## 2019-01-23 RX ADMIN — VITAMIN D, TAB 1000IU (100/BT) 2000 UNITS: 25 TAB at 09:01

## 2019-01-23 RX ADMIN — NIFEDIPINE 90 MG: 30 TABLET, FILM COATED, EXTENDED RELEASE ORAL at 09:01

## 2019-01-23 RX ADMIN — HEPARIN SODIUM 5000 UNITS: 5000 INJECTION, SOLUTION INTRAVENOUS; SUBCUTANEOUS at 02:01

## 2019-01-23 RX ADMIN — OXYCODONE HYDROCHLORIDE AND ACETAMINOPHEN 1 TABLET: 5; 325 TABLET ORAL at 04:01

## 2019-01-23 RX ADMIN — HEPARIN SODIUM 5000 UNITS: 5000 INJECTION, SOLUTION INTRAVENOUS; SUBCUTANEOUS at 11:01

## 2019-01-23 RX ADMIN — SENNOSIDES AND DOCUSATE SODIUM 1 TABLET: 8.6; 5 TABLET ORAL at 09:01

## 2019-01-23 RX ADMIN — FAMOTIDINE 20 MG: 20 TABLET ORAL at 09:01

## 2019-01-23 RX ADMIN — VENLAFAXINE HYDROCHLORIDE 75 MG: 75 CAPSULE, EXTENDED RELEASE ORAL at 09:01

## 2019-01-23 RX ADMIN — HEPARIN SODIUM 5000 UNITS: 5000 INJECTION, SOLUTION INTRAVENOUS; SUBCUTANEOUS at 06:01

## 2019-01-23 RX ADMIN — LEVOTHYROXINE SODIUM 250 MCG: 125 TABLET ORAL at 06:01

## 2019-01-23 RX ADMIN — OXYCODONE HYDROCHLORIDE 10 MG: 10 TABLET, FILM COATED, EXTENDED RELEASE ORAL at 09:01

## 2019-01-23 RX ADMIN — OXYCODONE HYDROCHLORIDE AND ACETAMINOPHEN 1 TABLET: 5; 325 TABLET ORAL at 06:01

## 2019-01-23 RX ADMIN — CYCLOBENZAPRINE HYDROCHLORIDE 5 MG: 5 TABLET, FILM COATED ORAL at 04:01

## 2019-01-23 RX ADMIN — MAGNESIUM OXIDE TAB 400 MG (241.3 MG ELEMENTAL MG) 400 MG: 400 (241.3 MG) TAB at 09:01

## 2019-01-23 NOTE — PLAN OF CARE
GERALD following for DC needs. GERALD in communication with CM.    SW received call from admissions at [facility]. Admissions stated that their physician is off today but she is going to call her to review the patient's referral and will notify SW if they can accept.      01/23/19 0923   Post-Acute Status   Post-Acute Authorization Placement   Post-Acute Placement Status Pending Medical Review     Janine Ayers LMSW  [facility]  [id number]

## 2019-01-23 NOTE — PROGRESS NOTES
Telemetry monitoring reported that patient had a pulse in the 140's.  Paged neuro surgery.  Patient stated that she was up in the bathroom.  Awaiting callback.

## 2019-01-23 NOTE — PLAN OF CARE
Planned discharge is home - Plan (A) or home with home health - Plan (B).     01/23/19 1626   Discharge Reassessment   Assessment Type Discharge Planning Reassessment   Provided patient/caregiver education on the expected discharge date and the discharge plan No   Do you have any problems affording any of your prescribed medications? No   Discharge Plan A Home   Discharge Plan B Home;Home Health   DME Needed Upon Discharge  none   Patient choice form signed by patient/caregiver N/A   Anticipated Discharge Disposition Home   Can the patient answer the patient profile reliably? Yes, cognitively intact   Describe the patient's ability to walk at the present time. Major restrictions/daily assistance from another person

## 2019-01-23 NOTE — PROGRESS NOTES
[facility]  Neurosurgery  Progress Note    Subjective:     History of Present Illness: Ms. Chen Beckwith is a 53 year old female with a PMHx of recently diagnosed metastatic breast cancer in 12/2016, who presents to [facility] as a transfer from [facility] for Neurosurgical evaluation. She originally presented to [facility] on 12/19/18 due to severe bilateral hip pain that limited her ability to walk. Imaging showed multiple mets in her hips and abdominal cavity. She presented back to [facility] on 1/15/19 to complete the scans of the brain and neck. At that time, it was discovered that her Ca levels were greatly increased so she was admitted. Once the imaging had resulted she was seen to have a clival mass and a C1 mass. She was transferred to [facility]. She reports constant neck, deltoid, back, hip, groin, and leg pain that worsens with movement or activity. She states that the neck pain has improved with the cervical collar. She denies any vision changes, seizure like activity, bladder/bowel incontinence, or symptoms of urinary retention. Denies any difficulty using her hands or dropping items frequently. Denies any use of anti coagulation.     Post-Op Info:  * No surgery found *         Interval History: Ms. Beckwith continues to complain of right hip & LBP.  No new weakness or paresthesias.  She denies neck pain at this time.    Medications:  Continuous Infusions:  Scheduled Meds:   famotidine  20 mg Oral BID    heparin (porcine)  5,000 Units Subcutaneous Q8H    levothyroxine  250 mcg Oral Before breakfast    magnesium oxide  400 mg Oral Daily    NIFEdipine  90 mg Oral Daily    oxyCODONE  10 mg Oral Q12H    senna-docusate 8.6-50 mg  1 tablet Oral BID    venlafaxine  75 mg Oral Daily    vitamin D  2,000 Units Oral Daily     PRN Meds:cyclobenzaprine, diphenhydrAMINE, oxyCODONE-acetaminophen     Review of Systems  Objective:     Weight: (79.9)  Body mass index is 29.31 kg/m².  Vital  Signs (Most Recent):  Temp: 98.9 °F (37.2 °C) (01/23/19 1525)  Pulse: 90 (01/23/19 1546)  Resp: 17 (01/23/19 1525)  BP: 121/80 (01/23/19 1525)  SpO2: (!) 93 % (01/23/19 1525) Vital Signs (24h Range):  Temp:  [97.6 °F (36.4 °C)-99.8 °F (37.7 °C)] 98.9 °F (37.2 °C)  Pulse:  [] 90  Resp:  [16-18] 17  SpO2:  [85 %-97 %] 93 %  BP: (118-129)/(76-90) 121/80          Neurosurgery Physical Exam   General: well developed, well nourished, no distress  Head: normocephalic, atraumatic  Neurologic: Alert and oriented. Thought content appropriate  GCS: Motor: 6/Verbal: 5/Eyes: 4 GCS Total: 15  Mental Status: Awake, Alert, Oriented x 4  Language: No aphasia  Speech: No dysarthria  Cranial nerves: face symmetric, tongue midline, CN II-XII grossly intact.   Eyes: pupils equal, round, reactive to light with accommodation, EOMI  Pulmonary: normal respirations, not labored, no accessory muscles used  Abdomen: soft, non-distended, not tender to palpation  Sensory: intact to light touch throughout  Motor Strength: Moves all extremities spontaneously with good tone. No abnormal movements seen.      Strength   Deltoids Triceps Biceps Wrist Extension Wrist Flexion Hand    Upper: R 4+/5 4+/5 5/5 5/5 5/5 5/5     L 5/5 5/5 5/5 5/5 5/5 5/5       Iliopsoas Quadriceps Knee  Flexion Tibialis  anterior Gastro- cnemius EHL   Lower: R 4+/5 5/5 5/5 5/5 5/5 5/5     L 4+/5 5/5 5/5 5/5 5/5 5/5      HF weakness limited by pain  Pronator Drift: no drift noted  Finger-to-nose: Intact bilaterally  Ruvalcaba: absent  Clonus: absent  Babinski: absent  Pulses: 2+ and symmetric radial and dorsalis pedis  Skin: warm, dry and intact, no rashes               Significant Labs:  Recent Labs   Lab 01/22/19 0358 01/23/19 0448   GLU 79 78    139   K 3.5 2.8*    103   CO2 24 23   BUN 10 10   CREATININE 0.8 0.8   CALCIUM 9.2 9.2     Recent Labs   Lab 01/22/19 0358 01/23/19 0448   WBC 5.71 6.06   HGB 10.3* 10.5*   HCT 32.5* 32.9*   * 421*      No results for input(s): LABPT, INR, APTT in the last 48 hours.  Microbiology Results (last 7 days)     ** No results found for the last 168 hours. **          Significant Diagnostics:  No new imaging    Assessment/Plan:     * Secondary malignant neoplasm of bone    53 year old female with a PMHx thyroid cancer s/p thyroidectomy, ER/[place]+, her 2 (-) breast cancer, who [facility] from [facility] with a newly diagnosed clival and cervical spine met.  -Patient neurologically stable on exam  -MRI C/T/L spine with diffuse bony metastasis throughout spine, but without any cord compression  -C collar at all times   -TLSO brace for pathologic fractures of L2, L3, S1. Due to cervical collar, patient required brace without sternum piece so it will not impede cervical collar. Provides appropriate support and immobilization. Wear when OOB.   -Brain MRI negative for metastasis  -Hem/Onc consulted - agree with outside oncologist plans for endocrine therapy & likely Ibrance, appreciate recs. Will follow-up with Hem/Onc provider at home after dc. They will organize follow-up with Rad/Onc in [place].   -Rad/Onc consulted - recommend treating pelvic, lumbosacral, and possibly clival metastases to 30 Gy in 10 fractions with intent to relieve pain and prevent neurologic progression, which should be able to be done at [facility]  -Discussed care with Dr. Morris at [facility] (Rad/Onc), plan to go ahead with radiation to [facility] whenever the patient is transferred back to [place]  -HTN: Continue home dose of Nifedipine  -Hypothyroidism: Continue home dose of Synthroid  -Depression/Anxiety: Continue home dose of Venlafaxine  -DVT prophylaxis: KENNEY's, SCD's, and SQH  -Atelectasis prevention: IS hourly while awake  -Bowel regimen: Senna BID  -Discharge tomorrow. Patient needing transportation via ambulance back to [facility] and will need to obtain insurance auth for transportation. SW still working on  this.  If unable to get ambulance transport, plan for discharge with family transport.   -Follow up in clinic after discharge with Cspine xray & Lspine xray  -Discussed with Dr. Manfred Diaz PA-C  Neurosurgery  [facility]

## 2019-01-23 NOTE — SUBJECTIVE & OBJECTIVE
Interval History: Ms. Beckwith continues to complain of right hip & LBP.  No new weakness or paresthesias.  She denies neck pain at this time.    Medications:  Continuous Infusions:  Scheduled Meds:   famotidine  20 mg Oral BID    heparin (porcine)  5,000 Units Subcutaneous Q8H    levothyroxine  250 mcg Oral Before breakfast    magnesium oxide  400 mg Oral Daily    NIFEdipine  90 mg Oral Daily    oxyCODONE  10 mg Oral Q12H    senna-docusate 8.6-50 mg  1 tablet Oral BID    venlafaxine  75 mg Oral Daily    vitamin D  2,000 Units Oral Daily     PRN Meds:cyclobenzaprine, diphenhydrAMINE, oxyCODONE-acetaminophen     Review of Systems  Objective:     Weight: (79.9)  Body mass index is 29.31 kg/m².  Vital Signs (Most Recent):  Temp: 98.9 °F (37.2 °C) (01/23/19 1525)  Pulse: 90 (01/23/19 1546)  Resp: 17 (01/23/19 1525)  BP: 121/80 (01/23/19 1525)  SpO2: (!) 93 % (01/23/19 1525) Vital Signs (24h Range):  Temp:  [97.6 °F (36.4 °C)-99.8 °F (37.7 °C)] 98.9 °F (37.2 °C)  Pulse:  [] 90  Resp:  [16-18] 17  SpO2:  [85 %-97 %] 93 %  BP: (118-129)/(76-90) 121/80          Neurosurgery Physical Exam   General: well developed, well nourished, no distress  Head: normocephalic, atraumatic  Neurologic: Alert and oriented. Thought content appropriate  GCS: Motor: 6/Verbal: 5/Eyes: 4 GCS Total: 15  Mental Status: Awake, Alert, Oriented x 4  Language: No aphasia  Speech: No dysarthria  Cranial nerves: face symmetric, tongue midline, CN II-XII grossly intact.   Eyes: pupils equal, round, reactive to light with accommodation, EOMI  Pulmonary: normal respirations, not labored, no accessory muscles used  Abdomen: soft, non-distended, not tender to palpation  Sensory: intact to light touch throughout  Motor Strength: Moves all extremities spontaneously with good tone. No abnormal movements seen.      Strength   Deltoids Triceps Biceps Wrist Extension Wrist Flexion Hand    Upper: R 4+/5 4+/5 5/5 5/5 5/5 5/5     L 5/5 5/5 5/5 5/5  5/5 5/5       Iliopsoas Quadriceps Knee  Flexion Tibialis  anterior Gastro- cnemius EHL   Lower: R 4+/5 5/5 5/5 5/5 5/5 5/5     L 4+/5 5/5 5/5 5/5 5/5 5/5      HF weakness limited by pain  Pronator Drift: no drift noted  Finger-to-nose: Intact bilaterally  Ruvalcaba: absent  Clonus: absent  Babinski: absent  Pulses: 2+ and symmetric radial and dorsalis pedis  Skin: warm, dry and intact, no rashes               Significant Labs:  Recent Labs   Lab 01/22/19  0358 01/23/19  0448   GLU 79 78    139   K 3.5 2.8*    103   CO2 24 23   BUN 10 10   CREATININE 0.8 0.8   CALCIUM 9.2 9.2     Recent Labs   Lab 01/22/19  0358 01/23/19 0448   WBC 5.71 6.06   HGB 10.3* 10.5*   HCT 32.5* 32.9*   * 421*     No results for input(s): LABPT, INR, APTT in the last 48 hours.  Microbiology Results (last 7 days)     ** No results found for the last 168 hours. **          Significant Diagnostics:  No new imaging

## 2019-01-23 NOTE — ASSESSMENT & PLAN NOTE
53 year old female with a PMHx thyroid cancer s/p thyroidectomy, ER/[place]+, her 2 (-) breast cancer, who presents to [facility] as a transfer from [facility] with a newly diagnosed clival and cervical spine met.   -Patient neurologically stable on exam  -MRI C/T/L spine with diffuse bony metastasis throughout spine, but without any cord compression  -C collar at all times   -TLSO brace ordered for pathologic fractures of L2, L3, S1. Due to cervical collar, patient required brace without sternum piece so it will not impede cervical collar. Provides appropriate support and immobilization. Wear when OOB.   -Brain MRI negative for metastasis  -Hem/Onc consulted - agree with outside oncologist plans for endocrine therapy & likely Ibrance, appreciate recs. Will follow-up with Hem/Onc provider at home after dc. They will organize follow-up with Rad/Onc in [place].   -Rad/Onc consulted - recommend treating pelvic, lumbosacral, and possibly clival metastases to 30 Gy in 10 fractions with intent to relieve pain and prevent neurologic progression, which should be able to be done at [facility]  -HTN: Continue home dose of Nifedipine  -Hypothyroidism: Continue home dose of Synthroid  -Depression/Anxiety: Continue home dose of Venlafaxine  -DVT prophylaxis: KENNEY's, SCD's, and SQH  -Atelectasis prevention: IS hourly while awake  -Bowel regimen: Senna BID  -Discharge tomorrow. Patient needing transportation via ambulance back to [facility] and will need to obtain insurance auth for transportation. SW to arrange this in the morning.   -Discussed with Dr. Shearer    - Follow-up in NSGY clinic after radiation tx completed.

## 2019-01-23 NOTE — PLAN OF CARE
GERALD following for DC needs. GERALD in communication with [facility].    Patient plan is to return home to begin radiation treatments. Patient needs stretcher transport home. GERALD called the [facility] and spoke to Dominique ([phone]) regarding insurance covering transport home for the patient. Dominique stated that GERALD needs to call [facility] to get the transport. GERALD called Case Management at [facility] and spoke to Lopez ([phone] or [phone]). Lopez stated that they have no documentation of the patient returning to [facility]. GERALD informed Lopez that GERALD is unsure if the patient needs to return to the hospital or needs to DC home. Lopez stated that they only cover transport for the patient if the patient is returning to the hospital.   GERALD informed the team of the above. Lizeth GALE, will speak to Dr. Shearer to decide if patient needs inpatient care or can return home.     Janine Ayers, GERALD  [facility]  [id number]

## 2019-01-23 NOTE — PLAN OF CARE
Problem: Adult Inpatient Plan of Care  Goal: Plan of Care Review  Outcome: Ongoing (interventions implemented as appropriate)  Patient aaox4, ALVES, pain adequately controlled with current regimen and percocet for breathrough pain. Plan for d/c back to [facility] in later today for rad/onc treatment. High fall risk and safety precautions maintained. Will continue to monitor.

## 2019-01-23 NOTE — ASSESSMENT & PLAN NOTE
53 year old female with a PMHx thyroid cancer s/p thyroidectomy, ER/ID+, her 2 (-) breast cancer, who [place] [facility] [place] [facility] with a newly diagnosed clival and cervical spine met.  -Patient neurologically stable on exam  -MRI C/T/L spine with diffuse bony metastasis throughout spine, but without any cord compression  -C collar at all times   -TLSO brace for pathologic fractures of L2, L3, S1. Due to cervical collar, patient required brace without sternum piece so it will not impede cervical collar. Provides appropriate support and immobilization. Wear when OOB.   -Brain MRI negative for metastasis  -Hem/Onc consulted - agree with outside oncologist plans for endocrine therapy & likely Ibrance, appreciate recs. Will follow-up with Hem/Onc provider at home after dc. They will organize follow-up with Rad/Onc in [place].   -Rad/Onc consulted - recommend treating pelvic, lumbosacral, and possibly clival metastases to 30 Gy in 10 fractions with intent to relieve pain and prevent neurologic progression, which should be able to be done at [facility]  -Discussed care with Dr. Morris at [facility] (Rad/Onc), plan to go ahead with radiation to [facility] whenever the patient is transferred back to [place]  -HTN: Continue home dose of Nifedipine  -Hypothyroidism: Continue home dose of Synthroid  -Depression/Anxiety: Continue home dose of Venlafaxine  -DVT prophylaxis: KENNEY's, SCD's, and SQH  -Atelectasis prevention: IS hourly while awake  -Bowel regimen: Senna BID  -Discharge tomorrow. Patient needing transportation via ambulance back to [facility] and will need to obtain insurance auth for transportation. SW still working on this.  If unable to get ambulance transport, plan for discharge with family transport.   -Follow up in clinic after discharge with Cspine xray & Lspine xray  -Discussed with Dr. Shearer

## 2019-01-23 NOTE — PROGRESS NOTES
[facility]  Neurosurgery  Progress Note    Subjective:     History of Present Illness: Ms. Chen Beckwith is a 53 year old female with a PMHx of recently diagnosed metastatic breast cancer in 12/2016, who presents to [facility] as a transfer from [facility] for Neurosurgical evaluation. She originally presented to [facility] on 12/19/18 due to severe bilateral hip pain that limited her ability to walk. Imaging showed multiple mets in her hips and abdominal cavity. She presented back to [facility] on 1/15/19 to complete the scans of the brain and neck. At that time, it was discovered that her Ca levels were greatly increased so she was admitted. Once the imaging had resulted she was seen to have a clival mass and a C1 mass. She was transferred to [facility]. She reports constant neck, deltoid, back, hip, groin, and leg pain that worsens with movement or activity. She states that the neck pain has improved with the cervical collar. She denies any vision changes, seizure like activity, bladder/bowel incontinence, or symptoms of urinary retention. Denies any difficulty using her hands or dropping items frequently. Denies any use of anti coagulation.     Post-Op Info:  * No surgery found *         Interval History: NAEON. Pain controlled with current regimen. Reports L tricep feels weaker after sleeping on it wrong during the night, otherwise exam stable. Reports compliance with c collar. Denies b/b dysfunction.     Medications:  Continuous Infusions:  Scheduled Meds:   famotidine  20 mg Oral BID    heparin (porcine)  5,000 Units Subcutaneous Q8H    k phos di & mono-sod phos mono  1 tablet Oral BID    levothyroxine  250 mcg Oral Before breakfast    magnesium oxide  400 mg Oral Daily    NIFEdipine  90 mg Oral Daily    oxyCODONE  10 mg Oral Q12H    senna-docusate 8.6-50 mg  1 tablet Oral BID    venlafaxine  75 mg Oral Daily    vitamin D  2,000 Units Oral Daily     PRN Meds:cyclobenzaprine,  diphenhydrAMINE, oxyCODONE-acetaminophen       Objective:     Weight: (79.9)  Body mass index is 29.31 kg/m².  Vital Signs (Most Recent):  Temp: 98.6 °F (37 °C) (01/22/19 1531)  Pulse: 90 (01/22/19 1546)  Resp: 16 (01/22/19 1531)  BP: 112/74 (01/22/19 1531)  SpO2: 96 % (01/22/19 1531) Vital Signs (24h Range):  Temp:  [97.9 °F (36.6 °C)-98.9 °F (37.2 °C)] 98.6 °F (37 °C)  Pulse:  [] 90  Resp:  [16-19] 16  SpO2:  [94 %-97 %] 96 %  BP: (112-133)/(74-89) 112/74     Date 01/22/19 0700 - 01/23/19 0659   Shift 2046-2393 6631-4459 5558-5964 24 Hour Total   INTAKE   P.O. 240   240   Shift Total(mL/kg) 240(3)   240(3)   OUTPUT   Shift Total(mL/kg)       Weight (kg) 79.9 79.9 79.9 79.9                   Neurosurgery Physical Exam    General: well developed, well nourished, no distress.   Head: normocephalic, atraumatic  Neurologic: Alert and oriented. Thought content appropriate.  GCS: Motor: 6/Verbal: 5/Eyes: 4 GCS Total: 15  Mental Status: Awake, Alert, Oriented x 4  Language: No aphasia  Speech: No dysarthria  Cranial nerves: face symmetric, tongue midline, CN II-XII grossly intact.   Eyes: pupils equal, round, reactive to light with accomodation, EOMI.   Pulmonary: normal respirations, no signs of respiratory distress  Abdomen: soft, non-distended, not tender to palpation  Sensory: intact to light touch throughout  Motor Strength:Moves all extremities spontaneously with good tone.  Full strength upper and lower extremities. Pain limited weakness noted to L deltoid. No abnormal movements seen.     Strength  Deltoids Triceps Biceps Wrist Extension Wrist Flexion Hand    Upper: R 5/5 5/5 5/5 5/5 5/5 5/5    L 4+/5 5/5 5/5 5/5 5/5 5/5     Iliopsoas Quadriceps Knee  Flexion Tibialis  anterior Gastro- cnemius EHL   Lower: R 5/5 5/5 5/5 5/5 5/5 5/5    L 5/5 5/5 5/5 5/5 5/5 5/5     Ruvalcaba: absent  Clonus: absent  Babinski: absent  Pulses: 2+ and symmetric radial and dorsalis pedis.  Skin: Skin is warm, dry and  intact.      Significant Labs:  Recent Labs   Lab 01/21/19  0434 01/22/19  0358   GLU  --  79   NA  --  142   K  --  3.5   CL  --  105   CO2  --  24   BUN  --  10   CREATININE  --  0.8   CALCIUM  --  9.2   MG 1.6  --      Recent Labs   Lab 01/21/19  0434 01/22/19  0358   WBC 6.53 5.71   HGB 10.1* 10.3*   HCT 31.3* 32.5*   * 387*     No results for input(s): LABPT, INR, APTT in the last 48 hours.  Microbiology Results (last 7 days)     ** No results found for the last 168 hours. **        Recent Lab Results       01/22/19 0358        Immature Granulocytes 0.2     Immature Grans (Abs) 0.01  Comment:  Mild elevation in immature granulocytes is non specific and   can be seen in a variety of conditions including stress response,   acute inflammation, trauma and pregnancy. Correlation with other   laboratory and clinical findings is essential.       Anion Gap 13     Baso # 0.03     Basophil% 0.5     BUN, Bld 10     Calcium 9.2     Chloride 105     CO2 24     Creatinine 0.8     Differential Method Automated     eGFR if African American >60.0     eGFR if non  >60.0  Comment:  Calculation used to obtain the estimated glomerular filtration  rate (eGFR) is the CKD-EPI equation.        Eos # 0.0     Eosinophil% 0.4     Glucose 79     Gran # (ANC) 3.9     Gran% 68.4     Hematocrit 32.5     Hemoglobin 10.3     Lymph # 1.4     Lymph% 23.8     MCH 26.6     MCHC 31.7     MCV 84     Mono # 0.4     Mono% 6.7     MPV 11.9     nRBC 0     Platelets 387     Potassium 3.5     RBC 3.87     RDW 16.5     Sodium 142     WBC 5.71         All pertinent labs from the last 24 hours have been reviewed.    Significant Diagnostics:  No new imaging to review.     Assessment/Plan:     * Secondary malignant neoplasm of bone    53 year old female with a PMHx thyroid cancer s/p thyroidectomy, ER/[place]+, her 2 (-) breast cancer, who presents to [facility] as a transfer from [facility] with a newly diagnosed clival and  cervical spine met.   -Patient neurologically stable on exam  -MRI C/T/L spine with diffuse bony metastasis throughout spine, but without any cord compression  -C collar at all times   -TLSO brace ordered for pathologic fractures of L2, L3, S1. Due to cervical collar, patient required brace without sternum piece so it will not impede cervical collar. Provides appropriate support and immobilization. Wear when OOB.   -Brain MRI negative for metastasis  -Hem/Onc consulted - agree with outside oncologist plans for endocrine therapy & likely Ibrance, appreciate recs. Will follow-up with Hem/Onc provider at home after dc. They will organize follow-up with Rad/Onc in [place].   -Rad/Onc consulted - recommend treating pelvic, lumbosacral, and possibly clival metastases to 30 Gy in 10 fractions with intent to relieve pain and prevent neurologic progression, which should be able to be done at [facility]  -HTN: Continue home dose of Nifedipine  -Hypothyroidism: Continue home dose of Synthroid  -Depression/Anxiety: Continue home dose of Venlafaxine  -DVT prophylaxis: KENNEY's, SCD's, and SQH  -Atelectasis prevention: IS hourly while awake  -Bowel regimen: Senna BID  -Discharge tomorrow. Patient needing transportation via ambulance back to [facility] and will need to obtain insurance auth for transportation. SW to arrange this in the morning.   -Discussed with Dr. Shearer    - Follow-up in NSGY clinic after radiation tx completed.          Gracie Chavira PA-C  Neurosurgery  [facility]

## 2019-01-24 VITALS
DIASTOLIC BLOOD PRESSURE: 81 MMHG | HEIGHT: 65 IN | WEIGHT: 176.13 LBS | HEART RATE: 93 BPM | RESPIRATION RATE: 18 BRPM | TEMPERATURE: 99 F | OXYGEN SATURATION: 94 % | BODY MASS INDEX: 29.34 KG/M2 | SYSTOLIC BLOOD PRESSURE: 122 MMHG

## 2019-01-24 DIAGNOSIS — C50.919 METASTATIC BREAST CANCER: Primary | ICD-10-CM

## 2019-01-24 LAB
ANION GAP SERPL CALC-SCNC: 12 MMOL/L
BASOPHILS # BLD AUTO: 0.06 K/UL
BASOPHILS NFR BLD: 0.8 %
BUN SERPL-MCNC: 11 MG/DL
CALCIUM SERPL-MCNC: 9.2 MG/DL
CHLORIDE SERPL-SCNC: 104 MMOL/L
CO2 SERPL-SCNC: 23 MMOL/L
CREAT SERPL-MCNC: 0.7 MG/DL
DIFFERENTIAL METHOD: ABNORMAL
EOSINOPHIL # BLD AUTO: 0 K/UL
EOSINOPHIL NFR BLD: 0.4 %
ERYTHROCYTE [DISTWIDTH] IN BLOOD BY AUTOMATED COUNT: 16.4 %
EST. GFR  (AFRICAN AMERICAN): >60 ML/MIN/1.73 M^2
EST. GFR  (NON AFRICAN AMERICAN): >60 ML/MIN/1.73 M^2
GLUCOSE SERPL-MCNC: 70 MG/DL
HCT VFR BLD AUTO: 31.3 %
HGB BLD-MCNC: 10.1 G/DL
IMM GRANULOCYTES # BLD AUTO: 0.03 K/UL
IMM GRANULOCYTES NFR BLD AUTO: 0.4 %
LYMPHOCYTES # BLD AUTO: 1.3 K/UL
LYMPHOCYTES NFR BLD: 18.7 %
MCH RBC QN AUTO: 27 PG
MCHC RBC AUTO-ENTMCNC: 32.3 G/DL
MCV RBC AUTO: 84 FL
MONOCYTES # BLD AUTO: 0.5 K/UL
MONOCYTES NFR BLD: 6.8 %
NEUTROPHILS # BLD AUTO: 5.2 K/UL
NEUTROPHILS NFR BLD: 72.9 %
NRBC BLD-RTO: 0 /100 WBC
PLATELET # BLD AUTO: 427 K/UL
PMV BLD AUTO: 11.8 FL
POTASSIUM SERPL-SCNC: 3 MMOL/L
POTASSIUM SERPL-SCNC: 3.5 MMOL/L
RBC # BLD AUTO: 3.74 M/UL
SODIUM SERPL-SCNC: 139 MMOL/L
WBC # BLD AUTO: 7.18 K/UL

## 2019-01-24 PROCEDURE — 25000003 PHARM REV CODE 250: Performed by: STUDENT IN AN ORGANIZED HEALTH CARE EDUCATION/TRAINING PROGRAM

## 2019-01-24 PROCEDURE — 63600175 PHARM REV CODE 636 W HCPCS: Performed by: PHYSICIAN ASSISTANT

## 2019-01-24 PROCEDURE — 84132 ASSAY OF SERUM POTASSIUM: CPT

## 2019-01-24 PROCEDURE — 36415 COLL VENOUS BLD VENIPUNCTURE: CPT

## 2019-01-24 PROCEDURE — 25000003 PHARM REV CODE 250: Performed by: PHYSICIAN ASSISTANT

## 2019-01-24 PROCEDURE — 80048 BASIC METABOLIC PNL TOTAL CA: CPT

## 2019-01-24 PROCEDURE — 99233 PR SUBSEQUENT HOSPITAL CARE,LEVL III: ICD-10-PCS | Mod: ,,, | Performed by: PHYSICIAN ASSISTANT

## 2019-01-24 PROCEDURE — 85025 COMPLETE CBC W/AUTO DIFF WBC: CPT

## 2019-01-24 PROCEDURE — 99233 SBSQ HOSP IP/OBS HIGH 50: CPT | Mod: ,,, | Performed by: PHYSICIAN ASSISTANT

## 2019-01-24 RX ORDER — POTASSIUM CHLORIDE 20 MEQ/1
40 TABLET, EXTENDED RELEASE ORAL ONCE
Status: COMPLETED | OUTPATIENT
Start: 2019-01-24 | End: 2019-01-24

## 2019-01-24 RX ORDER — OXYCODONE AND ACETAMINOPHEN 5; 325 MG/1; MG/1
1 TABLET ORAL EVERY 4 HOURS PRN
Qty: 40 TABLET | Refills: 0 | Status: SHIPPED | OUTPATIENT
Start: 2019-01-24

## 2019-01-24 RX ORDER — CYCLOBENZAPRINE HCL 5 MG
5 TABLET ORAL 3 TIMES DAILY PRN
Qty: 30 TABLET | Refills: 0 | Status: SHIPPED | OUTPATIENT
Start: 2019-01-24 | End: 2019-02-03

## 2019-01-24 RX ORDER — MORPHINE SULFATE 2 MG/ML
1 INJECTION, SOLUTION INTRAMUSCULAR; INTRAVENOUS ONCE
Status: COMPLETED | OUTPATIENT
Start: 2019-01-24 | End: 2019-01-24

## 2019-01-24 RX ADMIN — VENLAFAXINE HYDROCHLORIDE 75 MG: 75 CAPSULE, EXTENDED RELEASE ORAL at 09:01

## 2019-01-24 RX ADMIN — OXYCODONE HYDROCHLORIDE AND ACETAMINOPHEN 1 TABLET: 5; 325 TABLET ORAL at 02:01

## 2019-01-24 RX ADMIN — HEPARIN SODIUM 5000 UNITS: 5000 INJECTION, SOLUTION INTRAVENOUS; SUBCUTANEOUS at 06:01

## 2019-01-24 RX ADMIN — CYCLOBENZAPRINE HYDROCHLORIDE 5 MG: 5 TABLET, FILM COATED ORAL at 02:01

## 2019-01-24 RX ADMIN — POTASSIUM CHLORIDE 40 MEQ: 1500 TABLET, EXTENDED RELEASE ORAL at 09:01

## 2019-01-24 RX ADMIN — CYCLOBENZAPRINE HYDROCHLORIDE 5 MG: 5 TABLET, FILM COATED ORAL at 07:01

## 2019-01-24 RX ADMIN — FAMOTIDINE 20 MG: 20 TABLET ORAL at 09:01

## 2019-01-24 RX ADMIN — OXYCODONE HYDROCHLORIDE 10 MG: 10 TABLET, FILM COATED, EXTENDED RELEASE ORAL at 09:01

## 2019-01-24 RX ADMIN — LEVOTHYROXINE SODIUM 250 MCG: 125 TABLET ORAL at 06:01

## 2019-01-24 RX ADMIN — Medication 1 MG: at 02:01

## 2019-01-24 RX ADMIN — SENNOSIDES AND DOCUSATE SODIUM 1 TABLET: 8.6; 5 TABLET ORAL at 09:01

## 2019-01-24 RX ADMIN — NIFEDIPINE 90 MG: 30 TABLET, FILM COATED, EXTENDED RELEASE ORAL at 09:01

## 2019-01-24 RX ADMIN — MAGNESIUM OXIDE TAB 400 MG (241.3 MG ELEMENTAL MG) 400 MG: 400 (241.3 MG) TAB at 09:01

## 2019-01-24 RX ADMIN — VITAMIN D, TAB 1000IU (100/BT) 2000 UNITS: 25 TAB at 09:01

## 2019-01-24 RX ADMIN — OXYCODONE HYDROCHLORIDE AND ACETAMINOPHEN 1 TABLET: 5; 325 TABLET ORAL at 07:01

## 2019-01-24 NOTE — PROGRESS NOTES
[facility]  Neurosurgery  Progress Note    Subjective:     History of Present Illness: Ms. Chen Beckwith is a 53 year old female with a PMHx of recently diagnosed metastatic breast cancer in 12/2016, who presents to [facility] as a transfer from [facility] for Neurosurgical evaluation. She originally presented to [facility] on 12/19/18 due to severe bilateral hip pain that limited her ability to walk. Imaging showed multiple mets in her hips and abdominal cavity. She presented back to [facility] on 1/15/19 to complete the scans of the brain and neck. At that time, it was discovered that her Ca levels were greatly increased so she was admitted. Once the imaging had resulted she was seen to have a clival mass and a C1 mass. She was transferred to [facility]. She reports constant neck, deltoid, back, hip, groin, and leg pain that worsens with movement or activity. She states that the neck pain has improved with the cervical collar. She denies any vision changes, seizure like activity, bladder/bowel incontinence, or symptoms of urinary retention. Denies any difficulty using her hands or dropping items frequently. Denies any use of anti coagulation.     Post-Op Info:  * No surgery found *         Interval History: NAEON. Reports current pain controlled with PO medication. Denies new weakness, paresthesias. Transferring to [facility] for voiding, no b/b dysfunction. C-collar in place in bed. TLSO brace at bedside.     Medications:  Continuous Infusions:  Scheduled Meds:   famotidine  20 mg Oral BID    heparin (porcine)  5,000 Units Subcutaneous Q8H    levothyroxine  250 mcg Oral Before breakfast    magnesium oxide  400 mg Oral Daily    NIFEdipine  90 mg Oral Daily    oxyCODONE  10 mg Oral Q12H    senna-docusate 8.6-50 mg  1 tablet Oral BID    venlafaxine  75 mg Oral Daily    vitamin D  2,000 Units Oral Daily     PRN Meds:cyclobenzaprine, diphenhydrAMINE, oxyCODONE-acetaminophen       Objective:      Weight: (79.9)  Body mass index is 29.31 kg/m².  Vital Signs (Most Recent):  Temp: 98.9 °F (37.2 °C) (01/24/19 1109)  Pulse: 93 (01/24/19 1141)  Resp: 18 (01/24/19 1109)  BP: 122/81 (01/24/19 1109)  SpO2: (!) 94 % (01/24/19 1109) Vital Signs (24h Range):  Temp:  [97 °F (36.1 °C)-99.1 °F (37.3 °C)] 98.9 °F (37.2 °C)  Pulse:  [] 93  Resp:  [12-18] 18  SpO2:  [92 %-96 %] 94 %  BP: (116-128)/(80-92) 122/81     Date 01/24/19 0700 - 01/25/19 0659   Shift 5268-1253 4498-2285 3279-1409 24 Hour Total   INTAKE   P.O. 385   385   Shift Total(mL/kg) 385(4.8)   385(4.8)   OUTPUT   Urine(mL/kg/hr) 500   500   Shift Total(mL/kg) 500(6.3)   500(6.3)   Weight (kg) 79.9 79.9 79.9 79.9                   Neurosurgery Physical Exam    General: well developed, well nourished, no distress.   Head: normocephalic, atraumatic  Neurologic: Alert and oriented. Thought content appropriate.  GCS: Motor: 6/Verbal: 5/Eyes: 4 GCS Total: 15  Mental Status: Awake, Alert, Oriented x 4  Language: No aphasia  Speech: No dysarthria  Cranial nerves: face symmetric, tongue midline, CN II-XII grossly intact.   Eyes: pupils equal, round, reactive to light with accomodation, EOMI.  Pulmonary: normal respirations, no signs of respiratory distress  Abdomen: soft, non-distended, not tender to palpation  Sensory: intact to light touch throughout  Motor Strength:Moves all extremities spontaneously with good tone.  Full strength upper and lower extremities. No abnormal movements seen.     Strength  Deltoids Triceps Biceps Wrist Extension Wrist Flexion Hand    Upper: R 4+/5 5/5 5/5 5/5 5/5 5/5    L 5/5 5/5 5/5 5/5 5/5 5/5     Iliopsoas Quadriceps Knee  Flexion Tibialis  anterior Gastro- cnemius EHL   Lower: R 4+/5 5/5 5/5 5/5 5/5 5/5    L 5/5 5/5 5/5 5/5 5/5 5/5     Ruvalcaba: absent  Clonus: absent  Babinski: absent  Pulses: 2+ and symmetric radial and dorsalis pedis.  Skin: Skin is warm, dry and intact.    Cervical collar in place.     Significant  Labs:  Recent Labs   Lab 01/23/19  0448 01/24/19  0611   GLU 78 70    139   K 2.8* 3.0*    104   CO2 23 23   BUN 10 11   CREATININE 0.8 0.7   CALCIUM 9.2 9.2     Recent Labs   Lab 01/23/19  0448 01/24/19  0611   WBC 6.06 7.18   HGB 10.5* 10.1*   HCT 32.9* 31.3*   * 427*     No results for input(s): LABPT, INR, APTT in the last 48 hours.  Microbiology Results (last 7 days)     ** No results found for the last 168 hours. **        Recent Lab Results       01/24/19  0611        Immature Granulocytes 0.4     Immature Grans (Abs) 0.03  Comment:  Mild elevation in immature granulocytes is non specific and   can be seen in a variety of conditions including stress response,   acute inflammation, trauma and pregnancy. Correlation with other   laboratory and clinical findings is essential.       Anion Gap 12     Baso # 0.06     Basophil% 0.8     BUN, Bld 11     Calcium 9.2     Chloride 104     CO2 23     Creatinine 0.7     Differential Method Automated     eGFR if African American >60.0     eGFR if non  >60.0  Comment:  Calculation used to obtain the estimated glomerular filtration  rate (eGFR) is the CKD-EPI equation.        Eos # 0.0     Eosinophil% 0.4     Glucose 70     Gran # (ANC) 5.2     Gran% 72.9     Hematocrit 31.3     Hemoglobin 10.1     Lymph # 1.3     Lymph% 18.7     MCH 27.0     MCHC 32.3     MCV 84     Mono # 0.5     Mono% 6.8     MPV 11.8     nRBC 0     Platelets 427     Potassium 3.0     RBC 3.74     RDW 16.4     Sodium 139     WBC 7.18         All pertinent labs from the last 24 hours have been reviewed.    Significant Diagnostics:  No new imaging to review.     Assessment/Plan:     * Secondary malignant neoplasm of bone    53 year old female with a PMHx thyroid cancer s/p thyroidectomy, ER/[place]+, her 2 (-) breast cancer, who presents to [facility] as a transfer from [facility] with a newly diagnosed clival and cervical spine met.    -Patient neurologically  stable on exam  -Hypokalemia: 3.0 today (2.8). Given one dose KCl 40 mEq PO. Will recheck prior to discharge.   -MRI C/T/L spine with diffuse bony metastasis throughout spine, but without any cord compression  -C collar at all times   -TLSO brace for pathologic fractures of L2, L3, S1. Due to cervical collar, patient required brace without sternum piece so it will not impede cervical collar. Provides appropriate support and immobilization. Wear when OOB.   -Brain MRI negative for metastasis  -Hem/Onc consulted - agree with outside oncologist plans for endocrine therapy & likely Ibrance, appreciate recs. Will follow-up with Hem/Onc provider at home after dc. They will organize follow-up with Rad/Onc in [place].   -Rad/Onc consulted - recommend treating pelvic, lumbosacral, and possibly clival metastases to 30 Gy in 10 fractions with intent to relieve pain and prevent neurologic progression, which should be able to be done at [facility]  -Discussed care with Dr. Morris at [facility] (Rad/Onc), plan to go ahead with radiation to [facility] whenever the patient is transferred back to [place]  -HTN: Continue home dose of Nifedipine  -Hypothyroidism: Continue home dose of Synthroid  -Depression/Anxiety: Continue home dose of Venlafaxine  -DVT prophylaxis: KENNEY's, SCD's, and SQH  -Atelectasis prevention: IS hourly while awake  -Bowel regimen: Senna BID  -Discharge home today. Patient states she would like her family to pick her up and bring her home, she has called to arrange this.   -Follow up in clinic after radiation treatment with Cspine xray & Lspine xray             Gracie Chavira PA-C  Neurosurgery  [facility]

## 2019-01-24 NOTE — NURSING
Patient to be discharged home with family/vss / no other distress at this time/ no request or complaints/ comfort measures given/ plan of care reviewed/ will continue to monitor patient

## 2019-01-24 NOTE — PLAN OF CARE
Patient to be discharged home.  The patient does not have any home needs.  The patient will follow up with Heme/Oc and Rad/Onc in [place].  Family to provide transportation home.      01/24/19 1634   Final Note   Assessment Type Final Discharge Note   Anticipated Discharge Disposition Home   Hospital Follow Up  Appt(s) scheduled? No   Discharge plans and expectations educations in teach back method with documentation complete? Yes

## 2019-01-24 NOTE — SUBJECTIVE & OBJECTIVE
Interval History: NAEON. Reports current pain controlled with PO medication. Denies new weakness, paresthesias. Transferring to [facility] for voiding, no b/b dysfunction. C-collar in place in bed. TLSO brace at bedside.     Medications:  Continuous Infusions:  Scheduled Meds:   famotidine  20 mg Oral BID    heparin (porcine)  5,000 Units Subcutaneous Q8H    levothyroxine  250 mcg Oral Before breakfast    magnesium oxide  400 mg Oral Daily    NIFEdipine  90 mg Oral Daily    oxyCODONE  10 mg Oral Q12H    senna-docusate 8.6-50 mg  1 tablet Oral BID    venlafaxine  75 mg Oral Daily    vitamin D  2,000 Units Oral Daily     PRN Meds:cyclobenzaprine, diphenhydrAMINE, oxyCODONE-acetaminophen       Objective:     Weight: (79.9)  Body mass index is 29.31 kg/m².  Vital Signs (Most Recent):  Temp: 98.9 °F (37.2 °C) (01/24/19 1109)  Pulse: 93 (01/24/19 1141)  Resp: 18 (01/24/19 1109)  BP: 122/81 (01/24/19 1109)  SpO2: (!) 94 % (01/24/19 1109) Vital Signs (24h Range):  Temp:  [97 °F (36.1 °C)-99.1 °F (37.3 °C)] 98.9 °F (37.2 °C)  Pulse:  [] 93  Resp:  [12-18] 18  SpO2:  [92 %-96 %] 94 %  BP: (116-128)/(80-92) 122/81     Date 01/24/19 0700 - 01/25/19 0659   Shift 2613-2440 4422-0034 3032-7463 24 Hour Total   INTAKE   P.O. 385   385   Shift Total(mL/kg) 385(4.8)   385(4.8)   OUTPUT   Urine(mL/kg/hr) 500   500   Shift Total(mL/kg) 500(6.3)   500(6.3)   Weight (kg) 79.9 79.9 79.9 79.9                   Neurosurgery Physical Exam    General: well developed, well nourished, no distress.   Head: normocephalic, atraumatic  Neurologic: Alert and oriented. Thought content appropriate.  GCS: Motor: 6/Verbal: 5/Eyes: 4 GCS Total: 15  Mental Status: Awake, Alert, Oriented x 4  Language: No aphasia  Speech: No dysarthria  Cranial nerves: face symmetric, tongue midline, CN II-XII grossly intact.   Eyes: pupils equal, round, reactive to light with accomodation, EOMI.  Pulmonary: normal respirations, no signs of respiratory  distress  Abdomen: soft, non-distended, not tender to palpation  Sensory: intact to light touch throughout  Motor Strength:Moves all extremities spontaneously with good tone.  Full strength upper and lower extremities. No abnormal movements seen.     Strength  Deltoids Triceps Biceps Wrist Extension Wrist Flexion Hand    Upper: R 4+/5 5/5 5/5 5/5 5/5 5/5    L 5/5 5/5 5/5 5/5 5/5 5/5     Iliopsoas Quadriceps Knee  Flexion Tibialis  anterior Gastro- cnemius EHL   Lower: R 4+/5 5/5 5/5 5/5 5/5 5/5    L 5/5 5/5 5/5 5/5 5/5 5/5     Ruvalcaba: absent  Clonus: absent  Babinski: absent  Pulses: 2+ and symmetric radial and dorsalis pedis.  Skin: Skin is warm, dry and intact.    Cervical collar in place.     Significant Labs:  Recent Labs   Lab 01/23/19  0448 01/24/19  0611   GLU 78 70    139   K 2.8* 3.0*    104   CO2 23 23   BUN 10 11   CREATININE 0.8 0.7   CALCIUM 9.2 9.2     Recent Labs   Lab 01/23/19  0448 01/24/19  0611   WBC 6.06 7.18   HGB 10.5* 10.1*   HCT 32.9* 31.3*   * 427*     No results for input(s): LABPT, INR, APTT in the last 48 hours.  Microbiology Results (last 7 days)     ** No results found for the last 168 hours. **        Recent Lab Results       01/24/19  0611        Immature Granulocytes 0.4     Immature Grans (Abs) 0.03  Comment:  Mild elevation in immature granulocytes is non specific and   can be seen in a variety of conditions including stress response,   acute inflammation, trauma and pregnancy. Correlation with other   laboratory and clinical findings is essential.       Anion Gap 12     Baso # 0.06     Basophil% 0.8     BUN, Bld 11     Calcium 9.2     Chloride 104     CO2 23     Creatinine 0.7     Differential Method Automated     eGFR if African American >60.0     eGFR if non  >60.0  Comment:  Calculation used to obtain the estimated glomerular filtration  rate (eGFR) is the CKD-EPI equation.        Eos # 0.0     Eosinophil% 0.4     Glucose 70     Gran #  (ANC) 5.2     Gran% 72.9     Hematocrit 31.3     Hemoglobin 10.1     Lymph # 1.3     Lymph% 18.7     MCH 27.0     MCHC 32.3     MCV 84     Mono # 0.5     Mono% 6.8     MPV 11.8     nRBC 0     Platelets 427     Potassium 3.0     RBC 3.74     RDW 16.4     Sodium 139     WBC 7.18         All pertinent labs from the last 24 hours have been reviewed.    Significant Diagnostics:  No new imaging to review.

## 2019-01-24 NOTE — ASSESSMENT & PLAN NOTE
53 year old female with a PMHx thyroid cancer s/p thyroidectomy, ER/SC+, her 2 (-) breast cancer, who presents to [facility] as a transfer from [facility] with a newly diagnosed clival and cervical spine met.    -Patient neurologically stable on exam  -Hypokalemia: 3.0 today (2.8). Given one dose KCl 40 mEq PO. Will recheck prior to discharge.   -MRI C/T/L spine with diffuse bony metastasis throughout spine, but without any cord compression  -C collar at all times   -TLSO brace for pathologic fractures of L2, L3, S1. Due to cervical collar, patient required brace without sternum piece so it will not impede cervical collar. Provides appropriate support and immobilization. Wear when OOB.   -Brain MRI negative for metastasis  -Hem/Onc consulted - agree with outside oncologist plans for endocrine therapy & likely Ibrance, appreciate recs. Will follow-up with Hem/Onc provider at home after dc. They will organize follow-up with Rad/Onc in [place].   -Rad/Onc consulted - recommend treating pelvic, lumbosacral, and possibly clival metastases to 30 Gy in 10 fractions with intent to relieve pain and prevent neurologic progression, which should be able to be done at [facility]  -Discussed care with Dr. Morris at [facility] (Rad/Onc), plan to go ahead with radiation to [facility] whenever the patient is transferred back to [place]  -HTN: Continue home dose of Nifedipine  -Hypothyroidism: Continue home dose of Synthroid  -Depression/Anxiety: Continue home dose of Venlafaxine  -DVT prophylaxis: KENNEY's, SCD's, and SQH  -Atelectasis prevention: IS hourly while awake  -Bowel regimen: Senna BID  -Discharge home today. Patient states she would like her family to pick her up and bring her home, she has called to arrange this.   -Follow up in clinic after radiation treatment with Cspine xray & Lspine xray

## 2019-01-24 NOTE — DISCHARGE SUMMARY
[facility]  Neurosurgery  Discharge Summary      Patient Name: Chen Beckwith  MRN: [id number]  Admission Date: 1/18/2019  Hospital Length of Stay: 6 days  Discharge Date and Time: 1/24/2019  3:40 PM  Attending Physician: Letty att. providers found   Discharging Provider: Gracie Chavira PA-C  Primary Care Provider: Primary Doctor Letty    HPI:   Ms. Chen Beckwith is a 53 year old female with a PMHx of recently diagnosed metastatic breast cancer in 12/2016, who presents to [facility] as a transfer from [facility] for Neurosurgical evaluation. She originally presented to [facility] on 12/19/18 due to severe bilateral hip pain that limited her ability to walk. Imaging showed multiple mets in her hips and abdominal cavity. She presented back to [facility] on 1/15/19 to complete the scans of the brain and neck. At that time, it was discovered that her Ca levels were greatly increased so she was admitted. Once the imaging had resulted she was seen to have a clival mass and a C1 mass. She was transferred to [facility]. She reports constant neck, deltoid, back, hip, groin, and leg pain that worsens with movement or activity. She states that the neck pain has improved with the cervical collar. She denies any vision changes, seizure like activity, bladder/bowel incontinence, or symptoms of urinary retention. Denies any difficulty using her hands or dropping items frequently. Denies any use of anti coagulation.     * No surgery found *     Hospital Course: 1/18-20: Admit to NSGY service for metastatic work up and surgical planning, if indicated. MRI brain/pan spine/pelvis ordered. CT chest/abd/pelvis ordered.   1/21: Hem/Onc & rad/onc consulted today, f/u recs.  Plan for likely d/c home with home oncologist f/u rafal.  1/22: Hem/Onc follow-up arranged at home, they will contact Rad/Onc in that area regarding treatment. Reports L tricep feels weaker after sleeping on it wrong during the night, otherwise exam stable.  Dc tomorrow when transportation secured via ambulance back to [place]. Patient's insurance have to approve the transpo, SW will take care of this tomorrow morning. Reports compliance with c collar. Denies b/b dysfunction.   1/23: Hem/Onc & Rad/Onc follow up set up.  Spoke with Dr. Morris, patient's rad/onc.  Still awaiting transportation to be secured by insurance. No new weakness, paresthesias.  1/24: Patient wants to be transferred home by family. Requesting medication prior to transfer for 2 hour car ride home. Pain currently controlled with PO medication. No new weakness, paresthesias. Will follow-up with Dr. Shearer pending completion of radiation treatment.     Consults:   Consults (From admission, onward)        Status Ordering Provider     Inpatient consult to Hematology/Oncology  Once     Provider:  (Not yet assigned)    Completed CHIDI FONSECA     [facility] to [facility]  Once     Provider:  (Not yet assigned)    Completed DANNY MIKE     [facility]patient [facility]  Once     Provider:  (Not yet assigned)    Completed ISADORA SHEARER     Inpatient consult to Radiation Oncology  Once     Provider:  (Not yet assigned)    Completed CHIDI FONSECA          Significant Diagnostic Studies: Labs:   BMP:   Recent Labs   Lab 01/23/19  0448 01/24/19  0611 01/24/19  1234   GLU 78 70  --     139  --    K 2.8* 3.0* 3.5    104  --    CO2 23 23  --    BUN 10 11  --    CREATININE 0.8 0.7  --    CALCIUM 9.2 9.2  --     and CBC   Recent Labs   Lab 01/23/19  0448 01/24/19  0611   WBC 6.06 7.18   HGB 10.5* 10.1*   HCT 32.9* 31.3*   * 427*     Radiology: MRI C/T/L spine, MRI brain, MRI pelvis, CT chest/abdomen/pelvis    Pending Diagnostic Studies:     None        Final Active Diagnoses:    Diagnosis Date Noted POA    PRINCIPAL PROBLEM:  Neck pain due to malignant neoplastic disease [M54.2, C80.1]  Yes    Cancer of spine [C41.2]  Yes    Hypokalemia [E87.6] 01/19/2019 Yes     Secondary malignant neoplasm of bone [C79.51] 01/18/2019 Yes    Hypercalcemia [E83.52]  Yes    Cervical spinal mass [G95.9]  Yes    Essential hypertension [I10]  Yes    Depression [F32.9]  Yes    Metastatic breast cancer [C50.919]  Yes    Hip pain, bilateral [M25.551, M25.552]  Yes    Acute bilateral low back pain without sciatica [M54.5]  Yes    Hypothyroidism [E03.9]  Yes      Problems Resolved During this Admission:      Discharged Condition: good    Follow Up: Pending duration of radiation treatment at [facility], will follow-up in clinic with Dr. Shearer after treatment with XR cervical and lumbar spine.     Patient Instructions:      Notify your health care provider if you experience any of the following:  temperature >100.4     Notify your health care provider if you experience any of the following:  persistent nausea and vomiting or diarrhea     Notify your health care provider if you experience any of the following:  severe uncontrolled pain     Notify your health care provider if you experience any of the following:  redness, tenderness, or signs of infection (pain, swelling, redness, odor or green/yellow discharge around incision site)     Notify your health care provider if you experience any of the following:  difficulty breathing or increased cough     Notify your health care provider if you experience any of the following:  severe persistent headache     Notify your health care provider if you experience any of the following:  worsening rash     Notify your health care provider if you experience any of the following:  persistent dizziness, light-headedness, or visual disturbances     Notify your health care provider if you experience any of the following:  increased confusion or weakness     Activity as tolerated     Medications:  Reconciled Home Medications:      Medication List      CONTINUE taking these medications    aspirin 81 MG Chew  Take 81 mg by mouth once daily.     cetirizine 10  MG tablet  Commonly known as:  ZYRTEC  Take 10 mg by mouth once daily.     docusate calcium 240 mg capsule  Commonly known as:  SURFAK  Take 240 mg by mouth 2 (two) times daily as needed for Constipation (once to twice daily as needed for constipation).     ergocalciferol 50,000 unit Cap  Commonly known as:  ERGOCALCIFEROL  Take 50,000 Units by mouth every 7 days.        * levothyroxine 200 MCG tablet  Commonly known as:  SYNTHROID  Take 200 mcg by mouth once daily.     NIFEdipine 90 MG Tbsr  Commonly known as:  ADALAT CC  Take 90 mg by mouth once daily.     oxyCODONE 10 mg 12 hr tablet  Commonly known as:  OXYCONTIN  Take 10 mg by mouth every 12 (twelve) hours as needed for Pain.     ranitidine 150 MG tablet  Commonly known as:  ZANTAC  Take 150 mg by mouth 2 (two) times daily.     venlafaxine 150 MG Cp24  Commonly known as:  EFFEXOR-XR  Take 75 mg by mouth once daily.     vitamin D 1000 units Tab  Commonly known as:  VITAMIN D3  Take 2,000 Units by mouth once daily.                 ASK your doctor about these medications    * levothyroxine 50 MCG tablet  Commonly known as:  SYNTHROID  Take 50 mcg by mouth once daily.            Gracie Chavira PA-C  Neurosurgery  [facility]

## 2019-01-24 NOTE — CONSULTS
NICHOLAS Daniel notified that [facility] unable to see patient for IV access tonight.  Suggested calling Anesthesia fellow to obtain IV access over night.

## 2019-01-24 NOTE — PLAN OF CARE
SW following for DC needs. SW in communication with CM.      Patient family will transport patient home.    Janine Ayers LMSW  [facility]  [id number]

## 2019-01-24 NOTE — PLAN OF CARE
Problem: Adult Inpatient Plan of Care  Goal: Plan of Care Review  Outcome: Outcome(s) achieved Date Met: 01/24/19  Patient to be discharged home/ vss/ comfort measures given/ reviewed plan of care with patient and family/ will continue to monitor

## 2019-01-24 NOTE — DISCHARGE INSTRUCTIONS
Please follow ONLY the instructions that are checked below.    Activity Restrictions:  [x]  No lifting greater than 10 pounds.  [x]  Avoid bending and twisting the area of your spine lesions more than 45 degrees from neutral position in any direction.  []  No driving or operating machinery:  []  until cleared by your surgeon.  []  while taking narcotic pain medications or muscle relaxants.  []  No cervical collar, soft collar, or lumbar brace required.  [x]  Wear your brace at all times.   []  Wear your brace at all times except when flat in bed.  []  Wear brace for comfort only.      Discharge Medication/Follow-up:  [x]  Please refer to discharge medication reconciliation form.  []  Do not take ANY non-steroidal anti-inflammatory drugs (NSAIDS), including the following: ibuprofen, naprosyn, Aleve, Advil, Indocin, Mobic, or Celebrex for:  []  4 weeks  []  8 weeks  []  6 months  [x]  Prescriptions for appropriate medication will be given upon discharge.   [x]  Pain control:  Percocet           [x]  Muscle relaxer:  Flexeril          [x]  Take docusate (Colace 100 mg): take one capsule a day as needed for constipation. You can get this over the counter.  [x]  Follow-up appointment:  []  10-14 days post-op for wound check by physician assistant/nurse  [x]  4-6 weeks with MD after finishing radiation treatment.   [x]  with x-rays  []  without x-rays  [x]  An appointment will be mailed to you.      Call your doctor or go to the Emergency Room for any signs of infection, including: increased redness, drainage, pain, or fever (temperature ?101.5 for 24 hours). Call your doctor or go to the Emergency Room if there are any localized neurological changes; problems with speech, vision, numbness, tingling, weakness, or severe headache; or for other concerns.    Special Instructions:  [x]  No use of tobacco products.  [x]  Diet: Please eat a regular diet as tolerated.  []  Other diet:              Specific physician  instructions:           Physicians need 3 days' notice for pain medicine to be refilled. Pain medicine will only be refilled between 8 AM and 5 PM, Monday through Friday, due to Food and Drug Administration regulation of documentation.    If you have any questions about this form, please call [phone].    Form No. 44131 (Revised 10/31/2013)

## 2019-01-25 ENCOUNTER — TELEPHONE (OUTPATIENT)
Dept: NEUROSURGERY | Facility: CLINIC | Age: 54
End: 2019-01-25

## 2019-01-25 NOTE — TELEPHONE ENCOUNTER
Left message for patient regarding follow up appointment and getting radiation prior to her appointment.

## 2019-03-26 ENCOUNTER — HOSPITAL ENCOUNTER (OUTPATIENT)
Dept: RADIOLOGY | Facility: HOSPITAL | Age: 54
Discharge: HOME | End: 2019-03-26
Attending: PHYSICIAN ASSISTANT
Payer: OTHER GOVERNMENT

## 2019-03-26 ENCOUNTER — OFFICE VISIT (OUTPATIENT)
Dept: NEUROSURGERY | Facility: CLINIC | Age: 54
End: 2019-03-26
Payer: OTHER GOVERNMENT

## 2019-03-26 VITALS — SYSTOLIC BLOOD PRESSURE: 128 MMHG | DIASTOLIC BLOOD PRESSURE: 88 MMHG | HEART RATE: 107 BPM

## 2019-03-26 DIAGNOSIS — C50.919 METASTASIS FROM BREAST CANCER: ICD-10-CM

## 2019-03-26 DIAGNOSIS — C50.919 MALIGNANT NEOPLASM OF FEMALE BREAST, UNSPECIFIED ESTROGEN RECEPTOR STATUS, UNSPECIFIED LATERALITY, UNSPECIFIED SITE OF BREAST: Primary | ICD-10-CM

## 2019-03-26 DIAGNOSIS — C50.919 METASTATIC BREAST CANCER: ICD-10-CM

## 2019-03-26 DIAGNOSIS — M54.17 LUMBOSACRAL RADICULOPATHY AT L2: ICD-10-CM

## 2019-03-26 DIAGNOSIS — S32.030A CLOSED COMPRESSION FRACTURE OF THIRD LUMBAR VERTEBRA, INITIAL ENCOUNTER: ICD-10-CM

## 2019-03-26 DIAGNOSIS — C79.9 METASTASIS FROM BREAST CANCER: ICD-10-CM

## 2019-03-26 DIAGNOSIS — C79.51 SPINE METASTASIS: ICD-10-CM

## 2019-03-26 DIAGNOSIS — S32.020A CLOSED COMPRESSION FRACTURE OF SECOND LUMBAR VERTEBRA, INITIAL ENCOUNTER: ICD-10-CM

## 2019-03-26 PROCEDURE — 99999 PR PBB SHADOW E&M-EST. PATIENT-LVL V: ICD-10-PCS | Mod: PBBFAC,,, | Performed by: NEUROLOGICAL SURGERY

## 2019-03-26 PROCEDURE — 99215 OFFICE O/P EST HI 40 MIN: CPT | Mod: PBBFAC,25,PN | Performed by: NEUROLOGICAL SURGERY

## 2019-03-26 PROCEDURE — 72040 X-RAY EXAM NECK SPINE 2-3 VW: CPT | Mod: 26,,, | Performed by: RADIOLOGY

## 2019-03-26 PROCEDURE — 99999 PR PBB SHADOW E&M-EST. PATIENT-LVL V: CPT | Mod: PBBFAC,,, | Performed by: NEUROLOGICAL SURGERY

## 2019-03-26 PROCEDURE — 99213 PR OFFICE/OUTPT VISIT, EST, LEVL III, 20-29 MIN: ICD-10-PCS | Mod: S$PBB,,, | Performed by: NEUROLOGICAL SURGERY

## 2019-03-26 PROCEDURE — 72040 X-RAY EXAM NECK SPINE 2-3 VW: CPT | Mod: TC,PN

## 2019-03-26 PROCEDURE — 72100 XR LUMBAR SPINE AP AND LATERAL: ICD-10-PCS | Mod: 26,,, | Performed by: RADIOLOGY

## 2019-03-26 PROCEDURE — 72100 X-RAY EXAM L-S SPINE 2/3 VWS: CPT | Mod: 26,,, | Performed by: RADIOLOGY

## 2019-03-26 PROCEDURE — 72100 X-RAY EXAM L-S SPINE 2/3 VWS: CPT | Mod: TC,PN

## 2019-03-26 PROCEDURE — 99213 OFFICE O/P EST LOW 20 MIN: CPT | Mod: S$PBB,,, | Performed by: NEUROLOGICAL SURGERY

## 2019-03-26 PROCEDURE — 72040 XR CERVICAL SPINE AP LATERAL: ICD-10-PCS | Mod: 26,,, | Performed by: RADIOLOGY

## 2019-03-26 RX ORDER — DIPHENHYDRAMINE HYDROCHLORIDE 25 MG/1
CAPSULE ORAL
COMMUNITY
Start: 2018-12-17

## 2019-03-26 RX ORDER — OMEPRAZOLE 20 MG/1
CAPSULE, DELAYED RELEASE ORAL
COMMUNITY
Start: 2019-02-02

## 2019-03-26 RX ORDER — MIRTAZAPINE 15 MG/1
15 TABLET, FILM COATED ORAL NIGHTLY
COMMUNITY
End: 2019-07-23

## 2019-03-26 RX ORDER — POTASSIUM CHLORIDE 20 MEQ/1
TABLET, EXTENDED RELEASE ORAL
COMMUNITY
Start: 2019-02-15

## 2019-03-26 RX ORDER — PALBOCICLIB 125 MG/1
CAPSULE ORAL
COMMUNITY
Start: 2019-02-19

## 2019-03-26 RX ORDER — PROCHLORPERAZINE MALEATE 10 MG
TABLET ORAL
COMMUNITY
Start: 2019-02-12

## 2019-03-26 RX ORDER — CYCLOBENZAPRINE HCL 10 MG
TABLET ORAL
COMMUNITY
Start: 2019-03-05

## 2019-03-26 RX ORDER — LANOLIN ALCOHOL/MO/W.PET/CERES
CREAM (GRAM) TOPICAL
COMMUNITY
Start: 2019-03-05

## 2019-03-26 RX ORDER — SODIUM PHOSPHATE, DIBASIC, ANHYDROUS, POTASSIUM PHOSPHATE, MONOBASIC, AND SODIUM PHOSPHATE, MONOBASIC, MONOHYDRATE 852; 155; 130 MG/1; MG/1; MG/1
TABLET, COATED ORAL
COMMUNITY
Start: 2019-02-15

## 2019-03-26 RX ORDER — FULVESTRANT 50 MG/ML
INJECTION INTRAMUSCULAR
COMMUNITY
Start: 2019-02-19

## 2019-03-26 NOTE — PROGRESS NOTES
NEUROSURGICAL PROGRESS NOTE    DATE OF SERVICE:  03/26/2019    ATTENDING PHYSICIAN:  Ajith Shearer MD    SUBJECTIVE:    INTERIM HISTORY:    This is a very pleasant 53 y.o. female, who has plurimetastasic breast cancer with clivus metastasis involving also the occipital condyles without gross instability.  She has metastasis in the L2 and L3 vertebral body compression fracture and kyphosis deformity.  She has sacral metastasis as well.  She has received at 10 section of radiation therapy to skull base and low back with significant improvement in pain.  She has developed right hip flexion weakness and pain in the L2 distribution the right side.  She has occasional low back pain after physical therapy.  No new gait imbalance, upper extremity weakness or numbness, sphincter dysfunction.  She is compliant with wearing her TLSO brace and her cervical brace.  She will receive new treatment of chemotherapy.  She is able to ambulate with a walker.         Neck Disability  Neck Disability-Pain Score: 5  Neck Disability-Pain Intensity: The pain is moderate at the moment  Neck Disability-Personal Care: It is painful to look after myself and I am slow and careful  Neck Disability-Lifting: I can lift very light weights  Neck Disability-Reading: I cnat read as much as I want to, because of moderate pain in my neck  Neck Disability-Headaches: I have no headaches at all  Neck Disability-Concentration: I can concentrate fully, when I want to, with no difficulty  Neck Disability-work: I cannot do any work at all  Neck Disability-Driving: I cant drive my car at all  Neck Disability-Sleeping: My sleep is moderately disturbed (2-3 hours sleeplessness)  Neck Disability-Recreation: I can hardly do any recreation activities because of pain in my neck    PAST MEDICAL HISTORY:  Active Ambulatory Problems     Diagnosis Date Noted    Secondary malignant neoplasm of bone 01/18/2019    Hypercalcemia     Cervical spinal mass     Essential  hypertension     Depression     Metastatic breast cancer     Neck pain due to malignant neoplastic disease     Hip pain, bilateral     Acute bilateral low back pain without sciatica     Hypothyroidism     Hypokalemia 01/19/2019    Cancer of spine      Resolved Ambulatory Problems     Diagnosis Date Noted    No Resolved Ambulatory Problems     No Additional Past Medical History       PAST SURGICAL HISTORY:  No past surgical history on file.    SOCIAL HISTORY:   Social History     Socioeconomic History    Marital status:      Spouse name: Not on file    Number of children: Not on file    Years of education: Not on file    Highest education level: Not on file   Occupational History    Not on file   Social Needs    Financial resource strain: Not on file    Food insecurity:     Worry: Not on file     Inability: Not on file    Transportation needs:     Medical: Not on file     Non-medical: Not on file   Tobacco Use    Smoking status: Never Smoker    Smokeless tobacco: Never Used   Substance and Sexual Activity    Alcohol use: Not on file    Drug use: Not on file    Sexual activity: Not on file   Lifestyle    Physical activity:     Days per week: Not on file     Minutes per session: Not on file    Stress: Not on file   Relationships    Social connections:     Talks on phone: Not on file     Gets together: Not on file     Attends Restoration service: Not on file     Active member of club or organization: Not on file     Attends meetings of clubs or organizations: Not on file     Relationship status: Not on file    Intimate partner violence:     Fear of current or ex partner: Not on file     Emotionally abused: Not on file     Physically abused: Not on file     Forced sexual activity: Not on file   Other Topics Concern    Not on file   Social History Narrative    Not on file       FAMILY HISTORY:  No family history on file.    CURRENTS MEDICATIONS:  Current Outpatient Medications on File  Prior to Visit   Medication Sig Dispense Refill    ALLERGY RELIEF,DIPHENHYDRAMIN, 25 mg capsule       aspirin 81 MG Chew Take 81 mg by mouth once daily.      cetirizine (ZYRTEC) 10 MG tablet Take 10 mg by mouth once daily.      docusate calcium (SURFAK) 240 mg capsule Take 240 mg by mouth 2 (two) times daily as needed for Constipation (once to twice daily as needed for constipation).      ergocalciferol (ERGOCALCIFEROL) 50,000 unit Cap Take 50,000 Units by mouth every 7 days.      FASLODEX 250 mg/5 mL injection       IBRANCE 125 mg Cap       K-PHOS-NEUTRAL 250 mg tablet       levothyroxine (SYNTHROID) 200 MCG tablet Take 200 mcg by mouth once daily.      levothyroxine (SYNTHROID) 50 MCG tablet Take 50 mcg by mouth once daily.      magnesium oxide (MAG-OX) 400 mg (241.3 mg magnesium) tablet       mirtazapine (REMERON) 15 MG tablet Take 15 mg by mouth every evening.      NIFEdipine (ADALAT CC) 90 MG TbSR Take 90 mg by mouth once daily.      omeprazole (PRILOSEC) 20 MG capsule       oxyCODONE (OXYCONTIN) 10 mg 12 hr tablet Take 10 mg by mouth every 12 (twelve) hours as needed for Pain.      oxyCODONE-acetaminophen (PERCOCET) 5-325 mg per tablet Take 1 tablet by mouth every 4 (four) hours as needed. 40 tablet 0    potassium chloride SA (K-DUR,KLOR-CON) 20 MEQ tablet       prochlorperazine (COMPAZINE) 10 MG tablet       ranitidine (ZANTAC) 150 MG tablet Take 150 mg by mouth 2 (two) times daily.      vitamin D (VITAMIN D3) 1000 units Tab Take 2,000 Units by mouth once daily.      cyclobenzaprine (FLEXERIL) 10 MG tablet       venlafaxine (EFFEXOR-XR) 150 MG Cp24 Take 75 mg by mouth once daily.       No current facility-administered medications on file prior to visit.        ALLERGIES:  Review of patient's allergies indicates:   Allergen Reactions    Ace inhibitors Swelling     Tongue swells     Contrast media Rash       REVIEW OF SYSTEMS:  Review of Systems   Constitutional: Negative for  diaphoresis, fever and weight loss.   Respiratory: Negative for shortness of breath.    Cardiovascular: Negative for chest pain.   Gastrointestinal: Negative for blood in stool.   Genitourinary: Negative for hematuria.   Endo/Heme/Allergies: Does not bruise/bleed easily.   All other systems reviewed and are negative.        OBJECTIVE:    PHYSICAL EXAMINATION:   Vitals:    03/26/19 1101   BP: 128/88   Pulse: 107       Physical Exam:  Vitals reviewed.    Constitutional: She appears well-developed and well-nourished.     Eyes: Pupils are equal, round, and reactive to light. Conjunctivae and EOM are normal.     Cardiovascular: Normal distal pulses and no edema.     Abdominal: Soft.     Skin: Skin displays no rash on trunk and no rash on extremities. Skin displays no lesions on trunk and no lesions on extremities.     Psych/Behavior: She is alert. She is oriented to person, place, and time. She has a normal mood and affect.     Musculoskeletal:        Neck: Range of motion is limited.     Neurological:        DTRs: Tricep reflexes are 2+ on the right side and 2+ on the left side. Bicep reflexes are 2+ on the right side and 2+ on the left side. Brachioradialis reflexes are 2+ on the right side and 2+ on the left side. Patellar reflexes are 2+ on the right side and 2+ on the left side. Achilles reflexes are 2+ on the right side and 2+ on the left side.       Back Exam     Muscle Strength   Right Quadriceps:  5/5   Left Quadriceps:  5/5   Right Hamstrings:  5/5   Left Hamstrings:  5/5             Neurologic Exam     Mental Status   Oriented to person, place, and time.   Speech: speech is normal   Level of consciousness: alert    Cranial Nerves   Cranial nerves II through XII intact.     CN III, IV, VI   Pupils are equal, round, and reactive to light.  Extraocular motions are normal.     Motor Exam   Muscle bulk: normal  Overall muscle tone: normal    Strength   Right deltoid: 5/5  Left deltoid: 5/5  Right biceps: 5/5  Left  biceps: 5/5  Right triceps: 5/5  Left triceps: 5/5  Right wrist flexion: 5/5  Left wrist flexion: 5/5  Right wrist extension: 5/5  Left wrist extension: 5/5  Right interossei: 5/5  Left interossei: 5/5  Right iliopsoas: 3/5  Left iliopsoas: 5/5  Right quadriceps: 5/5  Left quadriceps: 5/5  Right hamstrin/5  Left hamstrin/5  Right anterior tibial: 5/5  Left anterior tibial: 5/5  Right posterior tibial: 5/5  Left posterior tibial: 5/5  Right peroneal: 5/5  Left peroneal: 5/5  Right gastroc: 5/5  Left gastroc: 5/5    Sensory Exam   Light touch normal.   Pinprick normal.     Gait, Coordination, and Reflexes     Reflexes   Right brachioradialis: 2+  Left brachioradialis: 2+  Right biceps: 2+  Left biceps: 2+  Right triceps: 2+  Left triceps: 2+  Right patellar: 2+  Left patellar: 2+  Right achilles: 2+  Left achilles: 2+  Right plantar: normal  Left plantar: normal  Right Ruvalcaba: present  Left Ruvalcaba: absent  Right ankle clonus: absent  Left ankle clonus: absent        DIAGNOSTIC DATA:  I personally interpreted the following imaging:   Cervical spine x-ray today shows normal alignment no signs of gross instability  Lumbar spine x-ray shows L2 and L3 compression fractures with kyphosis deformity    ASSESMENT:  This is a 53 y.o. female with     Problem List Items Addressed This Visit     None      Visit Diagnoses     Malignant neoplasm of female breast, unspecified estrogen receptor status, unspecified laterality, unspecified site of breast    -  Primary    Relevant Orders    CT Head Without Contrast    CT Lumbar Spine Without Contrast    Spine metastasis        Relevant Orders    CT Head Without Contrast    CT Lumbar Spine Without Contrast    Metastasis from breast cancer        Relevant Orders    CT Head Without Contrast    CT Lumbar Spine Without Contrast    Closed compression fracture of second lumbar vertebra, initial encounter        Closed compression fracture of third lumbar vertebra, initial encounter         Lumbosacral radiculopathy at L2                PLAN:  Patient is not interested in spinal surgery at this time  Keep wearing TLSO brace and cervical spine brace when sitting or standing  Follow-up in 3 months with a CT scan of the head and a lumbar CT scan            Ajith Shearer MD  Cell:[phone]

## 2019-04-10 ENCOUNTER — TELEPHONE (OUTPATIENT)
Dept: NEUROSURGERY | Facility: CLINIC | Age: 54
End: 2019-04-10

## 2019-07-22 ENCOUNTER — TELEPHONE (OUTPATIENT)
Dept: NEUROSURGERY | Facility: CLINIC | Age: 54
End: 2019-07-22

## 2019-07-23 ENCOUNTER — OFFICE VISIT (OUTPATIENT)
Dept: NEUROSURGERY | Facility: CLINIC | Age: 54
End: 2019-07-23
Payer: OTHER GOVERNMENT

## 2019-07-23 ENCOUNTER — HOSPITAL ENCOUNTER (OUTPATIENT)
Dept: RADIOLOGY | Facility: HOSPITAL | Age: 54
Discharge: HOME | End: 2019-07-23
Attending: NEUROLOGICAL SURGERY
Payer: OTHER GOVERNMENT

## 2019-07-23 VITALS
BODY MASS INDEX: 24.72 KG/M2 | SYSTOLIC BLOOD PRESSURE: 154 MMHG | DIASTOLIC BLOOD PRESSURE: 102 MMHG | HEIGHT: 65 IN | WEIGHT: 148.38 LBS | HEART RATE: 80 BPM

## 2019-07-23 DIAGNOSIS — C79.51 SPINE METASTASIS: Primary | ICD-10-CM

## 2019-07-23 DIAGNOSIS — M47.12 CERVICAL SPONDYLOSIS WITH MYELOPATHY: ICD-10-CM

## 2019-07-23 DIAGNOSIS — C79.51 SPINE METASTASIS: ICD-10-CM

## 2019-07-23 PROCEDURE — 99215 OFFICE O/P EST HI 40 MIN: CPT | Mod: PBBFAC,PN | Performed by: NEUROLOGICAL SURGERY

## 2019-07-23 PROCEDURE — 99999 PR PBB SHADOW E&M-EST. PATIENT-LVL V: CPT | Mod: PBBFAC,,, | Performed by: NEUROLOGICAL SURGERY

## 2019-07-23 PROCEDURE — 72050 X-RAY EXAM NECK SPINE 4/5VWS: CPT | Mod: TC,PN

## 2019-07-23 PROCEDURE — 99213 PR OFFICE/OUTPT VISIT, EST, LEVL III, 20-29 MIN: ICD-10-PCS | Mod: S$PBB,,, | Performed by: NEUROLOGICAL SURGERY

## 2019-07-23 PROCEDURE — 99213 OFFICE O/P EST LOW 20 MIN: CPT | Mod: S$PBB,,, | Performed by: NEUROLOGICAL SURGERY

## 2019-07-23 PROCEDURE — 99999 PR PBB SHADOW E&M-EST. PATIENT-LVL V: ICD-10-PCS | Mod: PBBFAC,,, | Performed by: NEUROLOGICAL SURGERY

## 2019-07-23 PROCEDURE — 72050 X-RAY EXAM NECK SPINE 4/5VWS: CPT | Mod: 26,,, | Performed by: RADIOLOGY

## 2019-07-23 PROCEDURE — 72050 XR CERVICAL SPINE AP LAT WITH FLEX EXTEN: ICD-10-PCS | Mod: 26,,, | Performed by: RADIOLOGY

## 2019-07-23 RX ORDER — MULTIVITAMIN
TABLET ORAL
COMMUNITY
Start: 2019-07-17

## 2019-07-23 RX ORDER — CARVEDILOL PHOSPHATE 10 MG/1
CAPSULE, EXTENDED RELEASE ORAL
COMMUNITY
Start: 2019-05-20

## 2019-07-23 RX ORDER — PALBOCICLIB 100 MG/1
CAPSULE ORAL
COMMUNITY
Start: 2019-06-19

## 2019-07-23 RX ORDER — ATORVASTATIN CALCIUM 40 MG/1
TABLET, FILM COATED ORAL
COMMUNITY
Start: 2019-05-02

## 2019-07-23 RX ORDER — OXYCODONE AND ACETAMINOPHEN 7.5; 325 MG/1; MG/1
TABLET ORAL
COMMUNITY
Start: 2019-07-11

## 2019-07-23 RX ORDER — POTASSIUM CHLORIDE 750 MG/1
TABLET, EXTENDED RELEASE ORAL
COMMUNITY
Start: 2019-05-20

## 2019-07-23 NOTE — PROGRESS NOTES
NEUROSURGICAL PROGRESS NOTE    DATE OF SERVICE:  07/23/2019    ATTENDING PHYSICIAN:  Ajith Shearer MD    SUBJECTIVE:    PRIOR HISTORY:    This is a very pleasant 53 y.o. female, who has plurimetastasic breast cancer with clivus metastasis involving also the occipital condyles without gross instability.  She has metastasis in the L2 and L3 vertebral body compression fracture and kyphosis deformity.  She has sacral metastasis as well.  She has received at 10 section of radiation therapy to skull base and low back with significant improvement in pain.  She has developed right hip flexion weakness and pain in the L2 distribution the right side.  She has occasional low back pain after physical therapy.  No new gait imbalance, upper extremity weakness or numbness, sphincter dysfunction.  She is compliant with wearing her TLSO brace and her cervical brace.  She will receive new treatment of chemotherapy.  She is able to ambulate with a walker.    INTERIM HISTORY    Received another set of radiation in June 2019  Actually treated with chemo. Wears her cervical collar and TLSO brace  Gait imbalance has been stable. Using a walker. Low back pain helped by using the brace. Mild bilateral neck pain without arm pain. Right hip pain better than initially. No new hands or legs weakness or numbness.  No sphincter dysfunction.          Neck Disability  Neck Disability-Pain Score: 5  Neck Disability-Pain Intensity: The pain is moderate at the moment  Neck Disability-Personal Care: I need some help, but manage most of my personal  Neck Disability-Lifting: I can lift very light weights  Neck Disability-Reading: I can read as much as I want to, with moderate pain in my neck  Neck Disability-Headaches: I have slight headaches that come infrequently  Neck Disability-Concentration: I cannot concentrate at all  Neck Disability-work: I cannot do any work at all  Neck Disability-Driving: I cant drive my car at all  Neck Disability-Sleeping:  My sleep is greatly disturbed (3-5 hours sleeplessness)  Neck Disability-Recreation: I cant do any recreation activities at all    PAST MEDICAL HISTORY:  Active Ambulatory Problems     Diagnosis Date Noted    Secondary malignant neoplasm of bone 01/18/2019    Hypercalcemia     Cervical spinal mass     Essential hypertension     Depression     Metastatic breast cancer     Neck pain due to malignant neoplastic disease     Hip pain, bilateral     Acute bilateral low back pain without sciatica     Hypothyroidism     Hypokalemia 01/19/2019    Cancer of spine      Resolved Ambulatory Problems     Diagnosis Date Noted    No Resolved Ambulatory Problems     No Additional Past Medical History       PAST SURGICAL HISTORY:  No past surgical history on file.    SOCIAL HISTORY:   Social History     Socioeconomic History    Marital status:      Spouse name: Not on file    Number of children: Not on file    Years of education: Not on file    Highest education level: Not on file   Occupational History    Not on file   Social Needs    Financial resource strain: Not on file    Food insecurity:     Worry: Not on file     Inability: Not on file    Transportation needs:     Medical: Not on file     Non-medical: Not on file   Tobacco Use    Smoking status: Never Smoker    Smokeless tobacco: Never Used   Substance and Sexual Activity    Alcohol use: Not on file    Drug use: Not on file    Sexual activity: Not on file   Lifestyle    Physical activity:     Days per week: Not on file     Minutes per session: Not on file    Stress: Not on file   Relationships    Social connections:     Talks on phone: Not on file     Gets together: Not on file     Attends Zoroastrianism service: Not on file     Active member of club or organization: Not on file     Attends meetings of clubs or organizations: Not on file     Relationship status: Not on file   Other Topics Concern    Not on file   Social History Narrative     Not on file       FAMILY HISTORY:  No family history on file.    CURRENTS MEDICATIONS:  Current Outpatient Medications on File Prior to Visit   Medication Sig Dispense Refill    aspirin 81 MG Chew Take 81 mg by mouth once daily.      atorvastatin (LIPITOR) 40 MG tablet       calcium-vitamin D 600 mg(1,500mg) -400 unit Tab       COREG CR 10 mg CM24       cyclobenzaprine (FLEXERIL) 10 MG tablet       FASLODEX 250 mg/5 mL injection       IBRANCE 100 mg Cap       IBRANCE 125 mg Cap       K-PHOS-NEUTRAL 250 mg tablet       levothyroxine (SYNTHROID) 200 MCG tablet Take 200 mcg by mouth once daily.      levothyroxine (SYNTHROID) 50 MCG tablet Take 25 mcg by mouth once daily.       magnesium oxide (MAG-OX) 400 mg (241.3 mg magnesium) tablet       NIFEdipine (ADALAT CC) 90 MG TbSR Take 90 mg by mouth once daily.      omeprazole (PRILOSEC) 20 MG capsule       oxyCODONE-acetaminophen (PERCOCET) 7.5-325 mg per tablet       potassium chloride (KLOR-CON) 10 MEQ TbSR       potassium chloride SA (K-DUR,KLOR-CON) 20 MEQ tablet       ranitidine (ZANTAC) 150 MG tablet Take 150 mg by mouth 2 (two) times daily.      venlafaxine (EFFEXOR-XR) 150 MG Cp24 Take 75 mg by mouth once daily.      vitamin D (VITAMIN D3) 1000 units Tab Take 2,000 Units by mouth once daily.      ALLERGY RELIEF,DIPHENHYDRAMIN, 25 mg capsule       cetirizine (ZYRTEC) 10 MG tablet Take 10 mg by mouth once daily.      docusate calcium (SURFAK) 240 mg capsule Take 240 mg by mouth 2 (two) times daily as needed for Constipation (once to twice daily as needed for constipation).      oxyCODONE-acetaminophen (PERCOCET) 5-325 mg per tablet Take 1 tablet by mouth every 4 (four) hours as needed. 40 tablet 0    prochlorperazine (COMPAZINE) 10 MG tablet       [DISCONTINUED] ergocalciferol (ERGOCALCIFEROL) 50,000 unit Cap Take 50,000 Units by mouth every 7 days.      [DISCONTINUED] mirtazapine (REMERON) 15 MG tablet Take 15 mg by mouth every evening.       [DISCONTINUED] oxyCODONE (OXYCONTIN) 10 mg 12 hr tablet Take 10 mg by mouth every 12 (twelve) hours as needed for Pain.       No current facility-administered medications on file prior to visit.        ALLERGIES:  Review of patient's allergies indicates:   Allergen Reactions    Ace inhibitors Swelling     Tongue swells     Contrast media Rash       REVIEW OF SYSTEMS:  Review of Systems   Constitutional: Negative for diaphoresis, fever and weight loss.   Respiratory: Negative for shortness of breath.    Cardiovascular: Negative for chest pain.   Gastrointestinal: Negative for blood in stool.   Genitourinary: Negative for hematuria.   Endo/Heme/Allergies: Does not bruise/bleed easily.   All other systems reviewed and are negative.        OBJECTIVE:    PHYSICAL EXAMINATION:   Vitals:    07/23/19 0920   BP: (!) 154/102   Pulse: 80       Physical Exam:  Vitals reviewed.    Constitutional: She appears well-developed and well-nourished.     Eyes: Pupils are equal, round, and reactive to light. Conjunctivae and EOM are normal.     Cardiovascular: Normal distal pulses and no edema.     Abdominal: Soft.     Skin: Skin displays no rash on trunk and no rash on extremities. Skin displays no lesions on trunk and no lesions on extremities.     Psych/Behavior: She is alert. She is oriented to person, place, and time. She has a normal mood and affect.     Musculoskeletal:        Neck: Range of motion is limited.     Neurological:        DTRs: Tricep reflexes are 2+ on the right side and 2+ on the left side. Bicep reflexes are 2+ on the right side and 2+ on the left side. Brachioradialis reflexes are 2+ on the right side and 2+ on the left side. Patellar reflexes are 2+ on the right side and 2+ on the left side. Achilles reflexes are 2+ on the right side and 2+ on the left side.       Back Exam     Range of Motion   Lateral bend left: normal     Muscle Strength   Right Quadriceps:  5/5   Left Quadriceps:  5/5   Right  Hamstrings:  5/5   Left Hamstrings:  5/5             SI joint:   Palpation at the right and left SI joints not painful  XUAN test is negative bilaterally  Gaenslen test is negative bilaterally  Thigh thrust test is negative bilaterally    Neurologic Exam     Mental Status   Oriented to person, place, and time.   Speech: speech is normal   Level of consciousness: alert    Cranial Nerves   Cranial nerves II through XII intact.     CN III, IV, VI   Pupils are equal, round, and reactive to light.  Extraocular motions are normal.     Motor Exam   Muscle bulk: normal  Overall muscle tone: normal    Strength   Right deltoid: 5/5  Left deltoid: 5/5  Right biceps: 5/5  Left biceps: 5/5  Right triceps: 5/5  Left triceps: 5/5  Right wrist flexion: 5/5  Left wrist flexion: 5/5  Right wrist extension: 5/5  Left wrist extension: 5/5  Right interossei: 4/5  Left interossei: 4/5  Right iliopsoas: 5/5  Left iliopsoas: 5/5  Right quadriceps: 5/5  Left quadriceps: 5/5  Right hamstrin/5  Left hamstrin/5  Right anterior tibial: 5/5  Left anterior tibial: 5/5  Right posterior tibial: 5/5  Left posterior tibial: 5/5  Right peroneal: 5/5  Left peroneal: 5/5  Right gastroc: 5/5  Left gastroc: 5/5    Sensory Exam   Light touch normal.   Pinprick normal.     Gait, Coordination, and Reflexes     Gait  Gait: spastic    Coordination   Finger to nose coordination: normal  Tandem walking coordination: normal    Reflexes   Right brachioradialis: 2+  Left brachioradialis: 2+  Right biceps: 2+  Left biceps: 2+  Right triceps: 2+  Left triceps: 2+  Right patellar: 2+  Left patellar: 2+  Right achilles: 2+  Left achilles: 2+  Right plantar: normal  Left plantar: normal  Right Ruvalcaba: absent  Left Ruvalcaba: absent  Right ankle clonus: absent  Left ankle clonus: absent        DIAGNOSTIC DATA:  I personally interpreted the following imaging:   Ct head, neck, thoracic and abdomen shows stability of the clivus lesion, L1 and L2 compression  fracture, multiples thoracic lesions, sacral lesion and right ala lesion.     ASSESMENT:  This is a 53 y.o. female with     Problem List Items Addressed This Visit        Neuro    Cervical spondylosis with myelopathy       Oncology    Spine metastasis - Primary    Relevant Orders    X-Ray Cervical Spine AP Lat with Flexion  Extension            PLAN:  Will have repeated CT scan in August  FU in September 2019  All questions answered  Keep using cervical collar and TLSO brace          Ajith Shearer MD  Cell:[phone]

## 2019-08-07 ENCOUNTER — TELEPHONE (OUTPATIENT)
Dept: NEUROSURGERY | Facility: CLINIC | Age: 54
End: 2019-08-07

## 2019-12-16 ENCOUNTER — OFFICE VISIT (OUTPATIENT)
Dept: NEUROSURGERY | Facility: CLINIC | Age: 54
End: 2019-12-16
Payer: OTHER GOVERNMENT

## 2019-12-16 VITALS
HEIGHT: 65 IN | SYSTOLIC BLOOD PRESSURE: 160 MMHG | HEART RATE: 71 BPM | TEMPERATURE: 98 F | WEIGHT: 148 LBS | DIASTOLIC BLOOD PRESSURE: 103 MMHG | BODY MASS INDEX: 24.66 KG/M2

## 2019-12-16 DIAGNOSIS — C79.51 SPINE METASTASIS: Primary | ICD-10-CM

## 2019-12-16 DIAGNOSIS — S32.020G CLOSED COMPRESSION FRACTURE OF L2 LUMBAR VERTEBRA WITH DELAYED HEALING, SUBSEQUENT ENCOUNTER: ICD-10-CM

## 2019-12-16 DIAGNOSIS — M47.22 CERVICAL SPONDYLOSIS WITH MYELOPATHY AND RADICULOPATHY: ICD-10-CM

## 2019-12-16 DIAGNOSIS — M47.12 CERVICAL SPONDYLOSIS WITH MYELOPATHY AND RADICULOPATHY: ICD-10-CM

## 2019-12-16 DIAGNOSIS — S32.030G CLOSED COMPRESSION FRACTURE OF L3 LUMBAR VERTEBRA WITH DELAYED HEALING, SUBSEQUENT ENCOUNTER: ICD-10-CM

## 2019-12-16 DIAGNOSIS — M89.9 SKULL LESION: ICD-10-CM

## 2019-12-16 PROCEDURE — 99999 PR PBB SHADOW E&M-EST. PATIENT-LVL IV: CPT | Mod: PBBFAC,,, | Performed by: NEUROLOGICAL SURGERY

## 2019-12-16 PROCEDURE — 99213 PR OFFICE/OUTPT VISIT, EST, LEVL III, 20-29 MIN: ICD-10-PCS | Mod: S$PBB,,, | Performed by: NEUROLOGICAL SURGERY

## 2019-12-16 PROCEDURE — 99214 OFFICE O/P EST MOD 30 MIN: CPT | Mod: PBBFAC,PN | Performed by: NEUROLOGICAL SURGERY

## 2019-12-16 PROCEDURE — 99213 OFFICE O/P EST LOW 20 MIN: CPT | Mod: S$PBB,,, | Performed by: NEUROLOGICAL SURGERY

## 2019-12-16 PROCEDURE — 99999 PR PBB SHADOW E&M-EST. PATIENT-LVL IV: ICD-10-PCS | Mod: PBBFAC,,, | Performed by: NEUROLOGICAL SURGERY

## 2019-12-16 NOTE — PROGRESS NOTES
NEUROSURGICAL PROGRESS NOTE    DATE OF SERVICE:  12/16/2019    ATTENDING PHYSICIAN:  Ajith Shearer MD    SUBJECTIVE:    PRIOR HISTORY:     This is a very pleasant 53 y.o. female, who has plurimetastasic breast cancer with clivus metastasis involving also the occipital condyles without gross instability.  She has metastasis in the L2 and L3 vertebral body compression fracture and kyphosis deformity.  She has sacral metastasis as well.  She has received at 10 section of radiation therapy to skull base and low back with significant improvement in pain.  She has developed right hip flexion weakness and pain in the L2 distribution the right side.  She has occasional low back pain after physical therapy.  No new gait imbalance, upper extremity weakness or numbness, sphincter dysfunction.  She is compliant with wearing her TLSO brace and her cervical brace.  She will receive new treatment of chemotherapy.  She is able to ambulate with a walker.      Received another set of radiation in June 2019  Actually treated with chemo. Wears her cervical collar and TLSO brace  Gait imbalance has been stable. Using a walker. Low back pain helped by using the brace. Mild bilateral neck pain without arm pain. Right hip pain better than initially. No new hands or legs weakness or numbness.  No sphincter dysfunction.     INTERIM HISTORY:    Since her last visit in July 2019 she has been doing relatively well.  She walks with a walker.  She is wearing her neck brace and her TLSO brace.  She is receiving chemotherapy.  She might receive further pelvis radiation therapy.  She is able to walk for about 5 min and then she gets tired.  She has developed worsening left hand weakness and numbness.  When questioned about myelopathy symptoms she reports that she had some since 2018.  She has gait imbalance, hand weakness.  She is not reporting sphincter dysfunction symptoms.  Her low back pain is not severe at this time.  She has right hip  pain.           Neck Disability  Neck Disability-Pain Score: 6  Neck Disability-Pain Intensity: The pain is moderate at the moment  Neck Disability-Personal Care: It is painful to look after myself and I am slow and careful  Neck Disability-Lifting: Pain prevents me from lifting heavy weights off the floor but I can manage light to medium weights if they are conveniently positioned  Neck Disability-Reading: I cnat read as much as I want to, because of moderate pain in my neck  Neck Disability-Headaches: I have slight headaches that come infrequently  Neck Disability-Concentration: I can concentrate fully, when I want to, with slight difficulty  Neck Disability-work: I can hardly do any work at all  Neck Disability-Driving: I can hardly drive at all because of severe pain in my neck  Neck Disability-Sleeping: My sleep is greatly disturbed (3-5 hours sleeplessness)  Neck Disability-Recreation: I cant do any recreation activities at all    PAST MEDICAL HISTORY:  Active Ambulatory Problems     Diagnosis Date Noted    Secondary malignant neoplasm of bone 01/18/2019    Hypercalcemia     Cervical spinal mass     Essential hypertension     Depression     Metastatic breast cancer     Neck pain due to malignant neoplastic disease     Hip pain, bilateral     Acute bilateral low back pain without sciatica     Hypothyroidism     Hypokalemia 01/19/2019    Spine metastasis     Cervical spondylosis with myelopathy 07/23/2019     Resolved Ambulatory Problems     Diagnosis Date Noted    No Resolved Ambulatory Problems     No Additional Past Medical History       PAST SURGICAL HISTORY:  No past surgical history on file.    SOCIAL HISTORY:   Social History     Socioeconomic History    Marital status:      Spouse name: Not on file    Number of children: Not on file    Years of education: Not on file    Highest education level: Not on file   Occupational History    Not on file   Social Needs    Financial  resource strain: Not on file    Food insecurity:     Worry: Not on file     Inability: Not on file    Transportation needs:     Medical: Not on file     Non-medical: Not on file   Tobacco Use    Smoking status: Never Smoker    Smokeless tobacco: Never Used   Substance and Sexual Activity    Alcohol use: Not on file    Drug use: Not on file    Sexual activity: Not on file   Lifestyle    Physical activity:     Days per week: Not on file     Minutes per session: Not on file    Stress: Not on file   Relationships    Social connections:     Talks on phone: Not on file     Gets together: Not on file     Attends Pentecostal service: Not on file     Active member of club or organization: Not on file     Attends meetings of clubs or organizations: Not on file     Relationship status: Not on file   Other Topics Concern    Not on file   Social History Narrative    Not on file       FAMILY HISTORY:  No family history on file.    CURRENTS MEDICATIONS:  Current Outpatient Medications on File Prior to Visit   Medication Sig Dispense Refill    ALLERGY RELIEF,DIPHENHYDRAMIN, 25 mg capsule       aspirin 81 MG Chew Take 81 mg by mouth once daily.      atorvastatin (LIPITOR) 40 MG tablet       calcium-vitamin D 600 mg(1,500mg) -400 unit Tab       cetirizine (ZYRTEC) 10 MG tablet Take 10 mg by mouth once daily.      COREG CR 10 mg CM24       cyclobenzaprine (FLEXERIL) 10 MG tablet       docusate calcium (SURFAK) 240 mg capsule Take 240 mg by mouth 2 (two) times daily as needed for Constipation (once to twice daily as needed for constipation).      FASLODEX 250 mg/5 mL injection       IBRANCE 100 mg Cap       IBRANCE 125 mg Cap       K-PHOS-NEUTRAL 250 mg tablet       levothyroxine (SYNTHROID) 200 MCG tablet Take 200 mcg by mouth once daily.      levothyroxine (SYNTHROID) 50 MCG tablet Take 25 mcg by mouth once daily.       magnesium oxide (MAG-OX) 400 mg (241.3 mg magnesium) tablet       NIFEdipine (ADALAT CC)  90 MG TbSR Take 90 mg by mouth once daily.      omeprazole (PRILOSEC) 20 MG capsule       oxyCODONE-acetaminophen (PERCOCET) 5-325 mg per tablet Take 1 tablet by mouth every 4 (four) hours as needed. 40 tablet 0    oxyCODONE-acetaminophen (PERCOCET) 7.5-325 mg per tablet       potassium chloride (KLOR-CON) 10 MEQ TbSR       potassium chloride SA (K-DUR,KLOR-CON) 20 MEQ tablet       prochlorperazine (COMPAZINE) 10 MG tablet       ranitidine (ZANTAC) 150 MG tablet Take 150 mg by mouth 2 (two) times daily.      venlafaxine (EFFEXOR-XR) 150 MG Cp24 Take 75 mg by mouth once daily.      vitamin D (VITAMIN D3) 1000 units Tab Take 2,000 Units by mouth once daily.       No current facility-administered medications on file prior to visit.        ALLERGIES:  Review of patient's allergies indicates:   Allergen Reactions    Ace inhibitors Swelling     Tongue swells     Contrast media Rash       REVIEW OF SYSTEMS:  Review of Systems   Constitutional: Negative for diaphoresis, fever and weight loss.   Respiratory: Negative for shortness of breath.    Cardiovascular: Negative for chest pain.   Gastrointestinal: Negative for blood in stool.   Genitourinary: Negative for hematuria.   Endo/Heme/Allergies: Does not bruise/bleed easily.   All other systems reviewed and are negative.        OBJECTIVE:    PHYSICAL EXAMINATION:   Vitals:    12/16/19 0927   BP: (!) 160/103   Pulse: 71   Temp: 98.3 °F (36.8 °C)       Physical Exam:  Vitals reviewed.    Constitutional: She appears well-developed and well-nourished.     Eyes: Pupils are equal, round, and reactive to light. Conjunctivae and EOM are normal.     Cardiovascular: Normal distal pulses and no edema.     Abdominal: Soft.     Skin: Skin displays no rash on trunk and no rash on extremities. Skin displays no lesions on trunk and no lesions on extremities.     Psych/Behavior: She is alert. She is oriented to person, place, and time. She has a normal mood and affect.      Musculoskeletal:        Neck: Range of motion is limited.     Neurological:        DTRs: Tricep reflexes are 2+ on the right side and 2+ on the left side. Bicep reflexes are 2+ on the right side and 2+ on the left side. Brachioradialis reflexes are 2+ on the right side and 2+ on the left side. Patellar reflexes are 2+ on the right side and 2+ on the left side. Achilles reflexes are 2+ on the right side and 2+ on the left side.       Back Exam     Muscle Strength   Right Quadriceps:  5/5   Left Quadriceps:  5/5   Right Hamstrings:  5/5   Left Hamstrings:  5/5             SI joint:   Palpation at the right and left SI joints not painful  XUAN test is negative bilaterally  Gaenslen test is negative bilaterally  Thigh thrust test is negative bilaterally    Neurologic Exam     Mental Status   Oriented to person, place, and time.   Speech: speech is normal   Level of consciousness: alert    Cranial Nerves   Cranial nerves II through XII intact.     CN III, IV, VI   Pupils are equal, round, and reactive to light.  Extraocular motions are normal.     Motor Exam   Muscle bulk: normal  Overall muscle tone: normal    Strength   Right deltoid: 5/5  Left deltoid: 5/5  Right biceps: 5/5  Left biceps: 5/5  Right triceps: 5/5  Left triceps: 5/5  Right wrist flexion: 5/5  Left wrist flexion: 5/5  Right wrist extension: 5/5  Left wrist extension: 5/5  Right interossei: 4/5  Left interossei: 3/5  Right iliopsoas: 5/5  Left iliopsoas: 5/5  Right quadriceps: 5/5  Left quadriceps: 5/5  Right hamstrin/5  Left hamstrin/5  Right anterior tibial: 5/5  Left anterior tibial: 5/5  Right posterior tibial: 5/5  Left posterior tibial: 5/5  Right peroneal: 5/5  Left peroneal: 5/5  Right gastroc: 5/5  Left gastroc: 5/5    Sensory Exam   Light touch normal.   Pinprick normal.     Gait, Coordination, and Reflexes     Coordination   Finger to nose coordination: normal  Tandem walking coordination: normal    Reflexes   Right  brachioradialis: 2+  Left brachioradialis: 2+  Right biceps: 2+  Left biceps: 2+  Right triceps: 2+  Left triceps: 2+  Right patellar: 2+  Left patellar: 2+  Right achilles: 2+  Left achilles: 2+  Right plantar: normal  Left plantar: normal  Right Ruvalcaba: absent  Left Ruvalcaba: absent  Right ankle clonus: absent  Left ankle clonus: absent        DIAGNOSTIC DATA:  I personally interpreted the following imaging:   CT scan of the neck, chest, abdomen and pelvis October 2019 compared to January 2019.  Further collapse of the L2 and L3 compression fracture without significant spinal stenosis.  Stability of the other osteolytic lesions.  No gross deformity or evidence of spinal instability.  MRI cervical spine January 2019 shows severe spondylosis from C3-4 to C6-7 with severe spinal cord stenosis and severe bilateral foraminal stenosis    ASSESMENT:  This is a 54 y.o. female with     Problem List Items Addressed This Visit        Oncology    Spine metastasis - Primary      Other Visit Diagnoses     Closed compression fracture of L2 lumbar vertebra with delayed healing, subsequent encounter        Closed compression fracture of L3 lumbar vertebra with delayed healing, subsequent encounter        Skull lesion        Cervical spondylosis with myelopathy and radiculopathy                PLAN:  Not a surgical candidate for cervical spondylosis with myelopathy and radiculopathy due to ongoing chemotherapy treatment  All questions answered  No surgical indication for compression fracture at this time  Patient will follow-up as needed          Ajith Shearer MD  Cell:[phone]